# Patient Record
Sex: FEMALE | Race: WHITE | NOT HISPANIC OR LATINO | Employment: OTHER | URBAN - METROPOLITAN AREA
[De-identification: names, ages, dates, MRNs, and addresses within clinical notes are randomized per-mention and may not be internally consistent; named-entity substitution may affect disease eponyms.]

---

## 2020-05-22 ENCOUNTER — HOSPITAL ENCOUNTER (EMERGENCY)
Facility: CLINIC | Age: 85
Discharge: HOME | End: 2020-05-22
Attending: EMERGENCY MEDICINE | Admitting: EMERGENCY MEDICINE
Payer: MEDICARE

## 2020-05-22 ENCOUNTER — APPOINTMENT (OUTPATIENT)
Dept: GENERAL RADIOLOGY | Facility: CLINIC | Age: 85
End: 2020-05-22
Attending: EMERGENCY MEDICINE
Payer: MEDICARE

## 2020-05-22 ENCOUNTER — APPOINTMENT (OUTPATIENT)
Dept: MRI IMAGING | Facility: CLINIC | Age: 85
End: 2020-05-22
Attending: EMERGENCY MEDICINE
Payer: MEDICARE

## 2020-05-22 VITALS
OXYGEN SATURATION: 96 % | HEART RATE: 89 BPM | SYSTOLIC BLOOD PRESSURE: 179 MMHG | TEMPERATURE: 97.8 F | DIASTOLIC BLOOD PRESSURE: 110 MMHG | RESPIRATION RATE: 25 BRPM

## 2020-05-22 DIAGNOSIS — I10 BENIGN ESSENTIAL HYPERTENSION: ICD-10-CM

## 2020-05-22 DIAGNOSIS — R42 DIZZINESS: ICD-10-CM

## 2020-05-22 LAB
ALBUMIN SERPL-MCNC: 3.9 G/DL (ref 3.4–5)
ALP SERPL-CCNC: 113 U/L (ref 40–150)
ALT SERPL W P-5'-P-CCNC: 38 U/L (ref 0–50)
ANION GAP SERPL CALCULATED.3IONS-SCNC: 7 MMOL/L (ref 3–14)
AST SERPL W P-5'-P-CCNC: 22 U/L (ref 0–45)
BASOPHILS # BLD AUTO: 0.1 10E9/L (ref 0–0.2)
BASOPHILS NFR BLD AUTO: 0.5 %
BILIRUB SERPL-MCNC: 0.3 MG/DL (ref 0.2–1.3)
BUN SERPL-MCNC: 12 MG/DL (ref 7–30)
CALCIUM SERPL-MCNC: 9.2 MG/DL (ref 8.5–10.1)
CHLORIDE SERPL-SCNC: 105 MMOL/L (ref 94–109)
CO2 SERPL-SCNC: 26 MMOL/L (ref 20–32)
CREAT SERPL-MCNC: 0.62 MG/DL (ref 0.52–1.04)
D DIMER PPP FEU-MCNC: 0.6 UG/ML FEU (ref 0–0.5)
DIFFERENTIAL METHOD BLD: NORMAL
EOSINOPHIL # BLD AUTO: 0.1 10E9/L (ref 0–0.7)
EOSINOPHIL NFR BLD AUTO: 1.2 %
ERYTHROCYTE [DISTWIDTH] IN BLOOD BY AUTOMATED COUNT: 14.4 % (ref 10–15)
GFR SERPL CREATININE-BSD FRML MDRD: 82 ML/MIN/{1.73_M2}
GLUCOSE SERPL-MCNC: 111 MG/DL (ref 70–99)
HCT VFR BLD AUTO: 42.2 % (ref 35–47)
HGB BLD-MCNC: 13.3 G/DL (ref 11.7–15.7)
IMM GRANULOCYTES # BLD: 0 10E9/L (ref 0–0.4)
IMM GRANULOCYTES NFR BLD: 0.4 %
LYMPHOCYTES # BLD AUTO: 2 10E9/L (ref 0.8–5.3)
LYMPHOCYTES NFR BLD AUTO: 18.3 %
MCH RBC QN AUTO: 28.5 PG (ref 26.5–33)
MCHC RBC AUTO-ENTMCNC: 31.5 G/DL (ref 31.5–36.5)
MCV RBC AUTO: 90 FL (ref 78–100)
MONOCYTES # BLD AUTO: 0.8 10E9/L (ref 0–1.3)
MONOCYTES NFR BLD AUTO: 7.7 %
NEUTROPHILS # BLD AUTO: 7.8 10E9/L (ref 1.6–8.3)
NEUTROPHILS NFR BLD AUTO: 71.9 %
NRBC # BLD AUTO: 0 10*3/UL
NRBC BLD AUTO-RTO: 0 /100
NT-PROBNP SERPL-MCNC: 44 PG/ML (ref 0–1800)
PLATELET # BLD AUTO: 378 10E9/L (ref 150–450)
POTASSIUM SERPL-SCNC: 3.7 MMOL/L (ref 3.4–5.3)
PROT SERPL-MCNC: 8 G/DL (ref 6.8–8.8)
RBC # BLD AUTO: 4.67 10E12/L (ref 3.8–5.2)
SODIUM SERPL-SCNC: 138 MMOL/L (ref 133–144)
TROPONIN I SERPL-MCNC: <0.015 UG/L (ref 0–0.04)
TSH SERPL DL<=0.005 MIU/L-ACNC: 0.82 MU/L (ref 0.4–4)
WBC # BLD AUTO: 10.8 10E9/L (ref 4–11)

## 2020-05-22 PROCEDURE — 25500064 ZZH RX 255 OP 636: Performed by: EMERGENCY MEDICINE

## 2020-05-22 PROCEDURE — 80053 COMPREHEN METABOLIC PANEL: CPT | Performed by: EMERGENCY MEDICINE

## 2020-05-22 PROCEDURE — 85025 COMPLETE CBC W/AUTO DIFF WBC: CPT | Performed by: EMERGENCY MEDICINE

## 2020-05-22 PROCEDURE — 83880 ASSAY OF NATRIURETIC PEPTIDE: CPT | Performed by: EMERGENCY MEDICINE

## 2020-05-22 PROCEDURE — 70553 MRI BRAIN STEM W/O & W/DYE: CPT

## 2020-05-22 PROCEDURE — 84443 ASSAY THYROID STIM HORMONE: CPT | Performed by: EMERGENCY MEDICINE

## 2020-05-22 PROCEDURE — 96360 HYDRATION IV INFUSION INIT: CPT | Mod: 59

## 2020-05-22 PROCEDURE — 96361 HYDRATE IV INFUSION ADD-ON: CPT

## 2020-05-22 PROCEDURE — 99285 EMERGENCY DEPT VISIT HI MDM: CPT | Mod: 25

## 2020-05-22 PROCEDURE — 25800030 ZZH RX IP 258 OP 636: Performed by: EMERGENCY MEDICINE

## 2020-05-22 PROCEDURE — 84484 ASSAY OF TROPONIN QUANT: CPT | Performed by: EMERGENCY MEDICINE

## 2020-05-22 PROCEDURE — A9585 GADOBUTROL INJECTION: HCPCS | Performed by: EMERGENCY MEDICINE

## 2020-05-22 PROCEDURE — 85379 FIBRIN DEGRADATION QUANT: CPT | Performed by: EMERGENCY MEDICINE

## 2020-05-22 PROCEDURE — 93005 ELECTROCARDIOGRAM TRACING: CPT

## 2020-05-22 PROCEDURE — 71045 X-RAY EXAM CHEST 1 VIEW: CPT

## 2020-05-22 RX ORDER — GADOBUTROL 604.72 MG/ML
7.5 INJECTION INTRAVENOUS ONCE
Status: COMPLETED | OUTPATIENT
Start: 2020-05-22 | End: 2020-05-22

## 2020-05-22 RX ADMIN — GADOBUTROL 7.5 ML: 604.72 INJECTION INTRAVENOUS at 20:30

## 2020-05-22 RX ADMIN — SODIUM CHLORIDE 500 ML: 9 INJECTION, SOLUTION INTRAVENOUS at 20:07

## 2020-05-22 ASSESSMENT — ENCOUNTER SYMPTOMS
SHORTNESS OF BREATH: 1
DIZZINESS: 1
FEVER: 0
COUGH: 0

## 2020-05-22 NOTE — ED AVS SNAPSHOT
[facility] Emergency Department  [address]  [facility] [address]  Phone:  [phone]  Fax:  [phone]                                    Venita Renee   MRN: [id number]    Department:  [facility] Emergency Department   Date of Visit:  5/22/2020           After Visit Summary Signature Page    I have received my discharge instructions, and my questions have been answered. I have discussed any challenges I see with this plan with the nurse or doctor.    ..........................................................................................................................................  Patient/Patient Representative Signature      ..........................................................................................................................................  Patient Representative Print Name and Relationship to Patient    ..................................................               ................................................  Date                                   Time    ..........................................................................................................................................  Reviewed by Signature/Title    ...................................................              ..............................................  Date                                               Time          22EPIC Rev 08/18

## 2020-05-22 NOTE — ED TRIAGE NOTES
Feeling wobbly and dizzy since Friday. Started exercising recently and thought maybe symptoms were due to that. Went to PN then thinking maybe it was a UTI. Went to PN and UA and EKG were complete. UA was normal, but EKG was abnormal so referred to ED. Denies chest pain, but this week has had some increased ZARATE.

## 2020-05-22 NOTE — ED PROVIDER NOTES
History     Chief Complaint:  Shortness of Breath and Abnormal Ekg    HPI   Venita Renee is a 84 year old female who presents from clinic for evaluation of of intermittent dizziness. This afternoon, the patient presented to her [facility] clinic with concern for a UTI in the setting of recent dizzy spells. She reports her UA was negative, however the provider referred her to the ED with concerns for an abnormal EKG. Here, she reports the dizziness is primarily associated with sitting up too fast. She denies any headache, numbness/tingling, or any other neurologic symptoms. She also reports some mild shortness of breath with movement over the last several weeks, however this has not been acutely worse in the last week and has been ongoing for awhile. She also denies any chest pain, cough, fever, or noted leg swelling. Of note, the patient is originally from [place] and reports moving to [place] only one year ago. No hx of DVT/PE. Unclear what was abnormal about her EKG in clinic today.    Allergies:  No Known Allergies     Medications:    Prilosec    Past Medical History:    GERD  Hypertension  Macular degeneration     Past Surgical History:    The patient does not have any pertinent past surgical history.    Family History:    No past pertinent family history.    Social History:  Negative for tobacco use.  Negative for alcohol use.  Negative for drug use.        Review of Systems   Constitutional: Negative for fever.   Respiratory: Positive for shortness of breath. Negative for cough.    Cardiovascular: Negative for chest pain and leg swelling.   Neurological: Positive for dizziness.   All other systems reviewed and are negative.    Physical Exam     Patient Vitals for the past 24 hrs:   BP Temp Temp src Pulse Heart Rate Resp SpO2   05/22/20 2000 (!) 179/119 -- -- 98 -- -- 95 %   05/22/20 1845 (!) 140/98 -- -- 104 105 25 95 %   05/22/20 1830 (!) 178/108 -- -- 108 113 30 94 %   05/22/20 1800 (!) 173/112 -- --  110 108 26 96 %   05/22/20 1745 (!) 160/110 -- -- 112 110 26 97 %   05/22/20 1700 (!) 165/107 -- -- 107 110 17 96 %   05/22/20 1645 (!) 157/107 -- -- 111 109 16 97 %   05/22/20 1630 (!) 155/106 -- -- 111 112 22 --   05/22/20 1605 -- -- -- 121 -- -- 96 %   05/22/20 1601 (!) 181/125 97.8  F (36.6  C) Oral -- 118 -- (!) 86 %      Physical Exam  General: Resting comfortably on the gurney  Eyes:  The pupils are equal and round    Conjunctivae and sclerae are normal  ENT:    Moist mucous membranes  Neck:  Normal range of motion  CV:  Tachycardic rate and regular rhythm    Skin warm and well perfused   Resp:  Lungs are clear    Non-labored    No rales    No wheezing   GI:  Abdomen is soft, there is no rigidity    No distension    No rebound tenderness     No abdominal tenderness  MS:  Normal muscular tone  Skin:  No rash or acute skin lesions noted  Neuro:   Awake, alert.      Gait steady      Finger to nose intact    Speech is normal and fluent.    Face is symmetric.     Moves all extremities equally    No arm or leg drift    SILT on bilateral UE/LE  Psych: Normal affect.  Appropriate interactions.    Emergency Department Course   ECG:  Indication: Shortness of Breath  Time: 1619  Vent. Rate 115 bpm. IN interval 164. QRS duration 77. QT/QTc 330/456. P-R-T axis 49 -27 76.    Sinus tachycardia.  Possible anterior infarct, age undetermined.   Abnormal ECG.   No old EKG for comparison. Read time: 1628.    Imaging:  Radiographic findings were communicated with the patient who voiced understanding of the findings.  XR Chest Port 1 view:   Negative chest, as per radiology.     MR Brain w/o & w/ contrast:  Negative for age, no bleed, mass, or areas of acute infarction, as per radiology.     Laboratory:  CBC: WBC: 10.8, HGB: 13.3, PLT: 378  CMP: Glucose 111 (H), o/w WNL (Creatinine: 0.62)    1649 Troponin: <0.015   D dimer: 0.6 (H)  BNP: 44  TSH: 0.82    Interventions:  2007  mL IV     Emergency Department Course:  Nursing  notes and vitals reviewed.   EKG obtained in the ED, see results above.     1640: I performed an exam of the patient as documented above.      IV was inserted and blood was drawn for laboratory testing, results above. Medicine administered as documented above.     Portable chest x-ray was obtained while she was in the emergency department, results above.      1915: I rechecked the patient and discussed the results of her workup thus far. I ambulated the patient and she walked with a steady gait. Plan will be discharge if her MRI is unremarkable.     The patient was sent for a MR brain while in the emergency department, results above.      Findings and plan explained to the Patient. Patient discharged home with instructions regarding supportive care, medications, and reasons to return. The importance of close follow-up was reviewed.    I personally reviewed the laboratory and imaging results with the Patient and answered all related questions prior to discharge.    Impression & Plan      Medical Decision Making:  Venita Renee is a 84 year old female who presents with dizziness. Patient with dizziness off and on last few days. Initial oxygen saturation incorrectly entered on arrival to ED as initially documented as 86% on RA but few minutes later was 96% on RA with no intervention. Was never hypoxic while in ED room even with ambulation and after ambulation. Non focal neurologic exam and did ambulate in ED and did well doing this. UA done at clinic that did not show UTI. EKG done in clinic that I am not able to see that was reportedly abnormal. EKG today shows sinus tachycardia with no acute ischemic changes. Labs today unremarkable including negative troponin. D-dimer within normal limits using age adjusted d-dimer and don't think additional workup for this is indicated in ED. Chest xray clear. MRI brain shows no signs of CVA. Patient did well when I ambulated her in ED. Reports that she does feel that she can  manage at home and feels safe at home. Declines walker. Unclear etiology of dizziness. Patient does not appear short of breath and she is not hypoxic. Actually felt pretty normal when walking in ED with no shortness of breath, chest pain or dizziness. Recommended follow-up with PCP. May need echocardiogram. Symptoms don't clearly sound like angina and don't think hospitalization is indicated. Recommended to move slowly when changing positions. Blood pressure mildly elevated in ED and recommended recheck with PCP.    Diagnosis:    ICD-10-CM    1. Dizziness  R42    2. Benign essential hypertension  I10      Disposition:  discharged to home    Discharge Medications:  New Prescriptions    No medications on file       Scribe Disclosure:  I, Linda Anamika, am serving as a scribe on 5/22/2020 at 4:52 PM to personally document services performed by Kaelyn Justice MD based on my observations and the provider's statements to me.     5/22/2020   [facility] EMERGENCY DEPARTMENT       Kaelyn Justice MD  05/22/20 [id number]       Kaelyn Justice MD  05/23/20 [id number]

## 2020-05-23 NOTE — DISCHARGE INSTRUCTIONS
Follow up with primary care provider   No signs of stroke on MRI  Make sure to change positions slowly when moving around  Keep hydrated at home  Recheck blood pressure with primary care provider

## 2020-05-24 LAB — INTERPRETATION ECG - MUSE: NORMAL

## 2020-11-08 ENCOUNTER — APPOINTMENT (OUTPATIENT)
Dept: CT IMAGING | Facility: CLINIC | Age: 85
End: 2020-11-08
Attending: EMERGENCY MEDICINE
Payer: MEDICARE

## 2020-11-08 ENCOUNTER — HOSPITAL ENCOUNTER (EMERGENCY)
Facility: CLINIC | Age: 85
Discharge: HOME | End: 2020-11-08
Attending: EMERGENCY MEDICINE | Admitting: EMERGENCY MEDICINE
Payer: MEDICARE

## 2020-11-08 VITALS
TEMPERATURE: 97.9 F | HEART RATE: 90 BPM | SYSTOLIC BLOOD PRESSURE: 144 MMHG | OXYGEN SATURATION: 96 % | RESPIRATION RATE: 12 BRPM | DIASTOLIC BLOOD PRESSURE: 93 MMHG

## 2020-11-08 DIAGNOSIS — E04.1 THYROID NODULE: ICD-10-CM

## 2020-11-08 DIAGNOSIS — G93.89 CEREBRAL VENTRICULOMEGALY: ICD-10-CM

## 2020-11-08 DIAGNOSIS — R42 DIZZINESS: ICD-10-CM

## 2020-11-08 LAB
ALBUMIN UR-MCNC: NEGATIVE MG/DL
ANION GAP SERPL CALCULATED.3IONS-SCNC: 5 MMOL/L (ref 3–14)
APPEARANCE UR: CLEAR
BASOPHILS # BLD AUTO: 0.1 10E9/L (ref 0–0.2)
BASOPHILS NFR BLD AUTO: 0.4 %
BILIRUB UR QL STRIP: NEGATIVE
BUN SERPL-MCNC: 11 MG/DL (ref 7–30)
CALCIUM SERPL-MCNC: 9.1 MG/DL (ref 8.5–10.1)
CHLORIDE SERPL-SCNC: 107 MMOL/L (ref 94–109)
CO2 SERPL-SCNC: 27 MMOL/L (ref 20–32)
COLOR UR AUTO: ABNORMAL
CREAT SERPL-MCNC: 0.64 MG/DL (ref 0.52–1.04)
DIFFERENTIAL METHOD BLD: ABNORMAL
EOSINOPHIL # BLD AUTO: 0 10E9/L (ref 0–0.7)
EOSINOPHIL NFR BLD AUTO: 0.3 %
ERYTHROCYTE [DISTWIDTH] IN BLOOD BY AUTOMATED COUNT: 14.9 % (ref 10–15)
GFR SERPL CREATININE-BSD FRML MDRD: 81 ML/MIN/{1.73_M2}
GLUCOSE SERPL-MCNC: 107 MG/DL (ref 70–99)
GLUCOSE UR STRIP-MCNC: NEGATIVE MG/DL
HCT VFR BLD AUTO: 40.7 % (ref 35–47)
HGB BLD-MCNC: 13.4 G/DL (ref 11.7–15.7)
HGB UR QL STRIP: ABNORMAL
IMM GRANULOCYTES # BLD: 0 10E9/L (ref 0–0.4)
IMM GRANULOCYTES NFR BLD: 0.3 %
INTERPRETATION ECG - MUSE: NORMAL
KETONES UR STRIP-MCNC: NEGATIVE MG/DL
LEUKOCYTE ESTERASE UR QL STRIP: NEGATIVE
LYMPHOCYTES # BLD AUTO: 1.7 10E9/L (ref 0.8–5.3)
LYMPHOCYTES NFR BLD AUTO: 13.1 %
MCH RBC QN AUTO: 29.3 PG (ref 26.5–33)
MCHC RBC AUTO-ENTMCNC: 32.9 G/DL (ref 31.5–36.5)
MCV RBC AUTO: 89 FL (ref 78–100)
MONOCYTES # BLD AUTO: 0.9 10E9/L (ref 0–1.3)
MONOCYTES NFR BLD AUTO: 7.1 %
MUCOUS THREADS #/AREA URNS LPF: PRESENT /LPF
NEUTROPHILS # BLD AUTO: 10.2 10E9/L (ref 1.6–8.3)
NEUTROPHILS NFR BLD AUTO: 78.8 %
NITRATE UR QL: NEGATIVE
NRBC # BLD AUTO: 0 10*3/UL
NRBC BLD AUTO-RTO: 0 /100
PH UR STRIP: 6.5 PH (ref 5–7)
PLATELET # BLD AUTO: 393 10E9/L (ref 150–450)
POTASSIUM SERPL-SCNC: 3.6 MMOL/L (ref 3.4–5.3)
RBC # BLD AUTO: 4.57 10E12/L (ref 3.8–5.2)
RBC #/AREA URNS AUTO: 1 /HPF (ref 0–2)
SODIUM SERPL-SCNC: 139 MMOL/L (ref 133–144)
SOURCE: ABNORMAL
SP GR UR STRIP: 1 (ref 1–1.03)
SQUAMOUS #/AREA URNS AUTO: 3 /HPF (ref 0–1)
TROPONIN I SERPL-MCNC: <0.015 UG/L (ref 0–0.04)
UROBILINOGEN UR STRIP-MCNC: NORMAL MG/DL (ref 0–2)
WBC # BLD AUTO: 13 10E9/L (ref 4–11)
WBC #/AREA URNS AUTO: 1 /HPF (ref 0–5)

## 2020-11-08 PROCEDURE — 80048 BASIC METABOLIC PNL TOTAL CA: CPT | Performed by: EMERGENCY MEDICINE

## 2020-11-08 PROCEDURE — 250N000011 HC RX IP 250 OP 636: Performed by: EMERGENCY MEDICINE

## 2020-11-08 PROCEDURE — 87086 URINE CULTURE/COLONY COUNT: CPT | Performed by: EMERGENCY MEDICINE

## 2020-11-08 PROCEDURE — 258N000003 HC RX IP 258 OP 636: Performed by: EMERGENCY MEDICINE

## 2020-11-08 PROCEDURE — 85025 COMPLETE CBC W/AUTO DIFF WBC: CPT | Performed by: EMERGENCY MEDICINE

## 2020-11-08 PROCEDURE — 70450 CT HEAD/BRAIN W/O DYE: CPT | Mod: XS

## 2020-11-08 PROCEDURE — 93005 ELECTROCARDIOGRAM TRACING: CPT

## 2020-11-08 PROCEDURE — 81001 URINALYSIS AUTO W/SCOPE: CPT | Performed by: EMERGENCY MEDICINE

## 2020-11-08 PROCEDURE — 84484 ASSAY OF TROPONIN QUANT: CPT | Performed by: EMERGENCY MEDICINE

## 2020-11-08 PROCEDURE — 70498 CT ANGIOGRAPHY NECK: CPT

## 2020-11-08 PROCEDURE — 250N000009 HC RX 250: Performed by: EMERGENCY MEDICINE

## 2020-11-08 PROCEDURE — 99285 EMERGENCY DEPT VISIT HI MDM: CPT | Mod: 25

## 2020-11-08 PROCEDURE — 96360 HYDRATION IV INFUSION INIT: CPT | Mod: 59

## 2020-11-08 PROCEDURE — 96361 HYDRATE IV INFUSION ADD-ON: CPT

## 2020-11-08 RX ORDER — SODIUM CHLORIDE 9 MG/ML
INJECTION, SOLUTION INTRAVENOUS CONTINUOUS
Status: DISCONTINUED | OUTPATIENT
Start: 2020-11-08 | End: 2020-11-08 | Stop reason: HOSPADM

## 2020-11-08 RX ORDER — IOPAMIDOL 755 MG/ML
70 INJECTION, SOLUTION INTRAVASCULAR ONCE
Status: COMPLETED | OUTPATIENT
Start: 2020-11-08 | End: 2020-11-08

## 2020-11-08 RX ORDER — MECLIZINE HYDROCHLORIDE 25 MG/1
25 TABLET ORAL ONCE
Status: DISCONTINUED | OUTPATIENT
Start: 2020-11-08 | End: 2020-11-08

## 2020-11-08 RX ADMIN — SODIUM CHLORIDE 90 ML: 9 INJECTION, SOLUTION INTRAVENOUS at 17:01

## 2020-11-08 RX ADMIN — SODIUM CHLORIDE 1000 ML: 9 INJECTION, SOLUTION INTRAVENOUS at 16:07

## 2020-11-08 RX ADMIN — IOPAMIDOL 140 ML: 755 INJECTION, SOLUTION INTRAVENOUS at 17:00

## 2020-11-08 ASSESSMENT — ENCOUNTER SYMPTOMS
COUGH: 0
SHORTNESS OF BREATH: 0
FEVER: 0

## 2020-11-08 NOTE — ED NOTES
Bed: ED28  Expected date:   Expected time:   Means of arrival:   Comments:  HE 85F dizziness/ vomitting  HTN

## 2020-11-08 NOTE — ED AVS SNAPSHOT
[facility] Emergency Dept  [address] [address]  Phone: [phone]  Fax: [phone]                                    Venita Renee   MRN: [id number]    Department: [facility] Emergency Dept   Date of Visit: 11/8/2020           After Visit Summary Signature Page    I have received my discharge instructions, and my questions have been answered. I have discussed any challenges I see with this plan with the nurse or doctor.    ..........................................................................................................................................  Patient/Patient Representative Signature      ..........................................................................................................................................  Patient Representative Print Name and Relationship to Patient    ..................................................               ................................................  Date                                   Time    ..........................................................................................................................................  Reviewed by Signature/Title    ...................................................              ..............................................  Date                                               Time          22EPIC Rev 08/18

## 2020-11-08 NOTE — ED TRIAGE NOTES
"Pt reports 1 episode of emesis and nausea. Dizziness since this afternoon with unsteady gait. Reports brief moment of unable to \"think correctly/fogginess\". Hx of UTIs. VSS for EMS.  .  "

## 2020-11-08 NOTE — ED PROVIDER NOTES
History   Chief Complaint  Transient Ataxia    HPI   Venita Renee is a 85 year old female with a history of hypertension, hyperlipidemia, macular degeneration, and recurrent UTI who was BIBA for evaluation of transient unsteadiness/dizziness today that lasted around 30 minutes while she was at home. Venita states she took a shower today and exercised in the pool before relaxing at home. She reports standing up to use the restroom when she had sudden onset of issues with her balance and issues with mentation. Patient then took a nap and woke up with resolution of symptoms. She denies any symptoms now. She denies dizziness, cough, fever, shortness of breath, chest pain, and sore throat. Of note, she explains she has a history of recurrent UTI with associated dizziness many times in the past. Venita notes that her symptoms today were very similar to her past UTIs.  Patient does state that she has had negative UAs that had positive culture results in the past.  She states that Denies history of diabetes or hyperlipidemia.    MR Brain w/o and w contrast  IMPRESSION:  1.  Negative for age, no bleed, mass, or areas of acute infarction    Allergies  Amlodipine  Cephalexin  Penicillins    Medications  Prilosec  Cozaar  Premarin    Past Medical History  Hypertension  Hyperlipidemia  Macular degenration    Past Surgical History  Total abdominal hysterectomy and salpingo-oophorectomy  bunionectomy    Family History  No past pertinent family history.    Social History  Tobacco use: former smoker  Alcohol use: yes  Drug use: no  Marital Status:     Review of Systems   Constitutional: Negative for fever.   Respiratory: Negative for cough and shortness of breath.    Cardiovascular: Negative for chest pain.   All other systems reviewed and are negative.    Physical Exam     Patient Vitals for the past 24 hrs:   BP Temp Temp src Pulse Resp SpO2   11/08/20 1745 -- -- -- 98 28 96 %   11/08/20 1645 -- -- -- -- -- 96 %    11/08/20 1630 (!) 145/104 -- -- -- -- --   11/08/20 1553 (!) 139/100 97.9  F (36.6  C) Oral 104 16 97 %       Physical Exam  VS: Reviewed per above  HENT: Mucous membranes moist, no nuchal rigidity  EYES: sclera anicteric  CV: Rate as noted, regular rhythm.   RESP: Effort normal. Breath sounds are normal bilaterally.  GI: no tenderness/rebound/guarding, not distended.  NEURO: GCS 15, cranial nerves II through XII are intact, 5 out of 5 strength in all 4 extremities, sensation is intact light touch in all 4 extremities. Normal finger-nose-finger testing bilaterally, no ataxia with ambulation to bathroom  MSK: No deformity of the extremities  SKIN: Warm and dry    Emergency Department Course   EKG  Indication: AMS  Time: 1617  Rate 100 bpm. IN interval 160. QRS duration 72. QT/QTc 372/479.   Normal sinus rhythm  Right atrial enlargement  Nonspecific ST and T wave abnormality  Abnormal ECG   No significant changes as compared to prior, dated 22/05/2020.  Read time: 1620  Read by Hai Chang MD    Imaging:  Radiology findings were communicated with the patient who voiced understanding of the findings.    CTA Head Neck with Contrast  IMPRESSION:  1. Mild atherosclerosis of the bilateral carotid bifurcations and mild  intracranial atherosclerosis. No flow limiting stenosis or large  vessel occlusion of the major craniocervical arterial vasculature.  2. Multiple thyroid nodules measuring up to 1.8 cm in the left lobe.  Inferior extension of the right more than left thyroid lobes into the  upper mediastinum. The appearance is consistent with a multinodular  goiter. If it would change the patient's clinical management, consider  follow-up thyroid ultrasound.    Head CT w/o contrast  IMPRESSION:  1. No definite CT findings of acute intracranial abnormality.  2. Brain atrophy and presumed chronic small vessel ischemic disease,  as described.  3. Redemonstrated mild to moderate ventriculomegaly,  somewhat  disproportionate to the degree of generalized brain parenchymal volume  loss. This may be related to central white matter volume loss and  extra-axial phenomenon; however, a component of chronic  communicating/normal pressure hydrocephalus cannot be entirely  excluded. Recommend clinical correlation.    Readings per Radiology    Laboratory:  Laboratory findings were communicated with the patient who voiced understanding of the findings.    CBC: WBC: 13.0 (high), HGB: 13.4, PLT: 393  BMP: Glucose 107 (high), o/w WNL (Creatinine: 0.64)  Troponin I (Collected at 1604): <0.015    UA with Microscopic: Blood: Trace (A), Squamous Epithelial/HPF: 3 (high), Mucous: Present (A), o/w WNL    Interventions:  1607 NS 1L IV    Emergency Department Course:  Past medical records, nursing notes, and vitals reviewed.    1552 I physically examined the patient as documented above.    EKG obtained in the ED, see results above.   IV was inserted and blood was drawn for laboratory testing, results above.  The patient provided a urine sample here in the emergency department. This was sent for laboratory testing, findings above.  The patient was sent for radiographs while in the emergency department, results above.     Findings and plan explained to the Patient. Patient discharged home with instructions regarding supportive care, medications, and reasons to return. The importance of close follow-up was reviewed.     I personally reviewed the laboratory and imaging results with the Patient and answered all related questions prior to discharge.     Impression & Plan   Medical Decision Making:  Patient presents to the ER for evaluation after episode of dizziness versus unsteadiness earlier today.  On arrival vital signs are reassuring.  On exam there are no appreciable neurological deficits.  By history, she no longer has ongoing symptoms.  She reports similar symptoms in the past with UTIs.  I see that she was evaluated in the ER in  May of this year for similar symptoms and had a negative MRI of the brain.  As her symptoms are resolved, occult stroke seems unlikely.  I did obtain vascular imaging as well as CT without contrast of the head due to possible TIA like event.  Fortunately there was no evidence of large vessel occlusion or dissection or intracranial hemorrhage.  There were incidental thyroid nodules, which was relayed to patient.  She states she knows about these and is following with routine ultrasound imaging.  Other basic labs are reassuring.  There is no evidence of UTI today.  Her urine was sent for culture however.  I did refer her to neurology as there was some mild ventriculomegaly as well as possible TIA like event.  I recommended she start a baby aspirin daily.  It does seem that she has had very similar symptoms in the past of unclear etiology, but possibly associated with UTIs.  Return precautions were discussed prior to discharge.    Diagnosis:    ICD-10-CM    1. Dizziness  R42    2. Cerebral ventriculomegaly  G93.89    3. Thyroid nodule  E04.1        Disposition:  Discharged to home.    Scribe Disclosure:  I, Gamaliel Mccallum, am serving as a scribe at 3:52 PM on 11/8/2020 to document services personally performed by Hai Chang MD based on my observations and the provider's statements to me.      Hai Chang MD  11/08/20 [id number]

## 2020-11-09 LAB
BACTERIA SPEC CULT: NORMAL
Lab: NORMAL
SPECIMEN SOURCE: NORMAL

## 2020-11-09 NOTE — DISCHARGE INSTRUCTIONS
Take a baby aspirin every day until you meet with neurologist.    Discharge Instructions  Dizziness (Lightheaded)  Today you were seen for dizziness.  Dizziness can be caused by many things and it can be very difficult to determine the cause of dizziness.  At this time, your provider has found no signs that your dizziness is due to a serious or life-threatening condition. However, sometimes there is a serious problem that does not show up right away, and it is important for you to follow up with your regular provider as instructed.  Generally, every Emergency Department visit should have a follow-up clinic visit with either a primary or a specialty clinic/provider. Please follow-up as instructed by your emergency provider today.      Return to the Emergency Department if:    You pass out (fainting or falling out), especially during exercise.    You develop chest pain, chest pressure or difficulty breathing.  Your feel an irregular heartbeat.  You have excessive vaginal bleeding, or blood in your stool or vomit (throw up).  You have a high fever.  Your symptoms get worse or more frequent.    If when you begin to feel dizzy or lightheaded, it is important to sit down or lay down immediately to prevent injury from falling.  If you were given a prescription for medicine here today, be sure to read all of the information (including the package insert) that comes with your prescription.  This will include important information about the medicine, its side effects, and any warnings that you need to know about.  The pharmacist who fills the prescription can provide more information and answer questions you may have about the medicine.  If you have questions or concerns that the pharmacist cannot address, please call or return to the Emergency Department.   Remember that you can always come back to the Emergency Department if you are not able to see your regular provider in the amount of time listed above, if you get any new  symptoms, or if there is anything that worries you.

## 2020-11-10 NOTE — RESULT ENCOUNTER NOTE
Final urine culture report is NEGATIVE per [facility] ED Lab Result protocol.    If NEGATIVE result, no change in treatment, per [facility] ED Lab Result protocol.

## 2023-08-20 ENCOUNTER — HOSPITAL ENCOUNTER (INPATIENT)
Facility: HOSPITAL | Age: 88
LOS: 2 days | Discharge: HOME HEALTH | DRG: 603 | End: 2023-08-22
Attending: STUDENT IN AN ORGANIZED HEALTH CARE EDUCATION/TRAINING PROGRAM | Admitting: STUDENT IN AN ORGANIZED HEALTH CARE EDUCATION/TRAINING PROGRAM
Payer: MEDICARE

## 2023-08-20 ENCOUNTER — APPOINTMENT (OUTPATIENT)
Dept: MRI IMAGING | Facility: HOSPITAL | Age: 88
DRG: 603 | End: 2023-08-20
Attending: STUDENT IN AN ORGANIZED HEALTH CARE EDUCATION/TRAINING PROGRAM
Payer: MEDICARE

## 2023-08-20 DIAGNOSIS — S81.001A OPEN WOUND OF KNEE, LEG, AND ANKLE, RIGHT, INITIAL ENCOUNTER: Primary | ICD-10-CM

## 2023-08-20 DIAGNOSIS — S81.801A OPEN WOUND OF KNEE, LEG, AND ANKLE, RIGHT, INITIAL ENCOUNTER: Primary | ICD-10-CM

## 2023-08-20 DIAGNOSIS — S91.001A OPEN WOUND OF KNEE, LEG, AND ANKLE, RIGHT, INITIAL ENCOUNTER: Primary | ICD-10-CM

## 2023-08-20 PROBLEM — L03.90 ACUTE CELLULITIS: Status: ACTIVE | Noted: 2023-08-20

## 2023-08-20 LAB
ANION GAP SERPL CALCULATED.3IONS-SCNC: 13 MMOL/L (ref 7–15)
BUN SERPL-MCNC: 10.7 MG/DL (ref 8–23)
CALCIUM SERPL-MCNC: 9.1 MG/DL (ref 8.8–10.2)
CHLORIDE SERPL-SCNC: 102 MMOL/L (ref 98–107)
CREAT SERPL-MCNC: 0.61 MG/DL (ref 0.51–0.95)
DEPRECATED HCO3 PLAS-SCNC: 22 MMOL/L (ref 22–29)
ERYTHROCYTE [DISTWIDTH] IN BLOOD BY AUTOMATED COUNT: 14.8 % (ref 10–15)
GFR SERPL CREATININE-BSD FRML MDRD: 86 ML/MIN/1.73M2
GLUCOSE SERPL-MCNC: 119 MG/DL (ref 70–99)
HCT VFR BLD AUTO: 33.9 % (ref 35–47)
HGB BLD-MCNC: 11.2 G/DL (ref 11.7–15.7)
MCH RBC QN AUTO: 28.7 PG (ref 26.5–33)
MCHC RBC AUTO-ENTMCNC: 33 G/DL (ref 31.5–36.5)
MCV RBC AUTO: 87 FL (ref 78–100)
PLATELET # BLD AUTO: 295 10E3/UL (ref 150–450)
POTASSIUM SERPL-SCNC: 3.6 MMOL/L (ref 3.4–5.3)
RBC # BLD AUTO: 3.9 10E6/UL (ref 3.8–5.2)
SODIUM SERPL-SCNC: 137 MMOL/L (ref 136–145)
WBC # BLD AUTO: 17.3 10E3/UL (ref 4–11)

## 2023-08-20 PROCEDURE — 120N000001 HC R&B MED SURG/OB

## 2023-08-20 PROCEDURE — 93005 ELECTROCARDIOGRAM TRACING: CPT

## 2023-08-20 PROCEDURE — 93010 ELECTROCARDIOGRAM REPORT: CPT | Mod: HIP | Performed by: INTERNAL MEDICINE

## 2023-08-20 PROCEDURE — 258N000003 HC RX IP 258 OP 636: Performed by: STUDENT IN AN ORGANIZED HEALTH CARE EDUCATION/TRAINING PROGRAM

## 2023-08-20 PROCEDURE — 93005 ELECTROCARDIOGRAM TRACING: CPT | Performed by: STUDENT IN AN ORGANIZED HEALTH CARE EDUCATION/TRAINING PROGRAM

## 2023-08-20 PROCEDURE — 99223 1ST HOSP IP/OBS HIGH 75: CPT | Mod: AI | Performed by: STUDENT IN AN ORGANIZED HEALTH CARE EDUCATION/TRAINING PROGRAM

## 2023-08-20 PROCEDURE — 80048 BASIC METABOLIC PNL TOTAL CA: CPT | Performed by: STUDENT IN AN ORGANIZED HEALTH CARE EDUCATION/TRAINING PROGRAM

## 2023-08-20 PROCEDURE — 36415 COLL VENOUS BLD VENIPUNCTURE: CPT | Performed by: STUDENT IN AN ORGANIZED HEALTH CARE EDUCATION/TRAINING PROGRAM

## 2023-08-20 PROCEDURE — A9585 GADOBUTROL INJECTION: HCPCS | Mod: JZ | Performed by: STUDENT IN AN ORGANIZED HEALTH CARE EDUCATION/TRAINING PROGRAM

## 2023-08-20 PROCEDURE — 85027 COMPLETE CBC AUTOMATED: CPT | Performed by: STUDENT IN AN ORGANIZED HEALTH CARE EDUCATION/TRAINING PROGRAM

## 2023-08-20 PROCEDURE — G1010 CDSM STANSON: HCPCS

## 2023-08-20 PROCEDURE — 255N000002 HC RX 255 OP 636: Mod: JZ | Performed by: STUDENT IN AN ORGANIZED HEALTH CARE EDUCATION/TRAINING PROGRAM

## 2023-08-20 PROCEDURE — 87040 BLOOD CULTURE FOR BACTERIA: CPT | Performed by: STUDENT IN AN ORGANIZED HEALTH CARE EDUCATION/TRAINING PROGRAM

## 2023-08-20 RX ORDER — LOSARTAN POTASSIUM 50 MG/1
100 TABLET ORAL DAILY
Status: DISCONTINUED | OUTPATIENT
Start: 2023-08-21 | End: 2023-08-22 | Stop reason: HOSPADM

## 2023-08-20 RX ORDER — VITAMIN E (DL,TOCOPHERYL ACET) 90 MG
200 CAPSULE ORAL DAILY
COMMUNITY

## 2023-08-20 RX ORDER — LIDOCAINE 40 MG/G
CREAM TOPICAL
Status: DISCONTINUED | OUTPATIENT
Start: 2023-08-20 | End: 2023-08-22 | Stop reason: HOSPADM

## 2023-08-20 RX ORDER — GADOBUTROL 604.72 MG/ML
5 INJECTION INTRAVENOUS ONCE
Status: COMPLETED | OUTPATIENT
Start: 2023-08-20 | End: 2023-08-20

## 2023-08-20 RX ORDER — HEPARIN SODIUM 5000 [USP'U]/.5ML
5000 INJECTION, SOLUTION INTRAVENOUS; SUBCUTANEOUS EVERY 12 HOURS
Status: DISCONTINUED | OUTPATIENT
Start: 2023-08-21 | End: 2023-08-22 | Stop reason: HOSPADM

## 2023-08-20 RX ORDER — AMOXICILLIN 250 MG
2 CAPSULE ORAL 2 TIMES DAILY
Status: DISCONTINUED | OUTPATIENT
Start: 2023-08-21 | End: 2023-08-22 | Stop reason: HOSPADM

## 2023-08-20 RX ORDER — ONDANSETRON 4 MG/1
4 TABLET, ORALLY DISINTEGRATING ORAL EVERY 6 HOURS PRN
Status: DISCONTINUED | OUTPATIENT
Start: 2023-08-20 | End: 2023-08-22 | Stop reason: HOSPADM

## 2023-08-20 RX ORDER — LOSARTAN POTASSIUM 100 MG/1
100 TABLET ORAL DAILY
COMMUNITY
Start: 2022-12-14

## 2023-08-20 RX ORDER — SODIUM CHLORIDE 9 MG/ML
INJECTION, SOLUTION INTRAVENOUS CONTINUOUS
Status: DISCONTINUED | OUTPATIENT
Start: 2023-08-20 | End: 2023-08-21

## 2023-08-20 RX ORDER — VIT A/VIT C/VIT E/ZINC/COPPER 4296-226
1 CAPSULE ORAL 2 TIMES DAILY
COMMUNITY

## 2023-08-20 RX ORDER — AMOXICILLIN 250 MG
1 CAPSULE ORAL 2 TIMES DAILY
Status: DISCONTINUED | OUTPATIENT
Start: 2023-08-21 | End: 2023-08-22 | Stop reason: HOSPADM

## 2023-08-20 RX ORDER — LEVOFLOXACIN 5 MG/ML
500 INJECTION, SOLUTION INTRAVENOUS EVERY 24 HOURS
Status: DISCONTINUED | OUTPATIENT
Start: 2023-08-21 | End: 2023-08-22 | Stop reason: HOSPADM

## 2023-08-20 RX ORDER — AMLODIPINE BESYLATE 2.5 MG/1
2.5 TABLET ORAL DAILY
Status: DISCONTINUED | OUTPATIENT
Start: 2023-08-21 | End: 2023-08-22 | Stop reason: HOSPADM

## 2023-08-20 RX ORDER — MULTIPLE VITAMINS W/ MINERALS TAB 9MG-400MCG
1 TAB ORAL DAILY
COMMUNITY

## 2023-08-20 RX ORDER — ONDANSETRON 2 MG/ML
4 INJECTION INTRAMUSCULAR; INTRAVENOUS EVERY 6 HOURS PRN
Status: DISCONTINUED | OUTPATIENT
Start: 2023-08-20 | End: 2023-08-22 | Stop reason: HOSPADM

## 2023-08-20 RX ORDER — AMLODIPINE BESYLATE 2.5 MG/1
2.5 TABLET ORAL DAILY
COMMUNITY

## 2023-08-20 RX ADMIN — SODIUM CHLORIDE, PRESERVATIVE FREE: 5 INJECTION INTRAVENOUS at 20:01

## 2023-08-20 RX ADMIN — GADOBUTROL 5 ML: 604.72 INJECTION INTRAVENOUS at 22:42

## 2023-08-20 ASSESSMENT — ACTIVITIES OF DAILY LIVING (ADL)
DIFFICULTY_COMMUNICATING: NO
FALL_HISTORY_WITHIN_LAST_SIX_MONTHS: YES
NUMBER_OF_TIMES_PATIENT_HAS_FALLEN_WITHIN_LAST_SIX_MONTHS: 1
CHANGE_IN_FUNCTIONAL_STATUS_SINCE_ONSET_OF_CURRENT_ILLNESS/INJURY: YES
DIFFICULTY_EATING/SWALLOWING: NO
DRESSING/BATHING_DIFFICULTY: NO
WALKING_OR_CLIMBING_STAIRS_DIFFICULTY: NO
TOILETING_ISSUES: NO
DOING_ERRANDS_INDEPENDENTLY_DIFFICULTY: YES
WEAR_GLASSES_OR_BLIND: YES
ADLS_ACUITY_SCORE: 22
HEARING_DIFFICULTY_OR_DEAF: NO
EQUIPMENT_CURRENTLY_USED_AT_HOME: WALKER, ROLLING
ADLS_ACUITY_SCORE: 27
CONCENTRATING,_REMEMBERING_OR_MAKING_DECISIONS_DIFFICULTY: NO
VISION_MANAGEMENT: MACULAR DEGENERATION

## 2023-08-21 ENCOUNTER — APPOINTMENT (OUTPATIENT)
Dept: OCCUPATIONAL THERAPY | Facility: HOSPITAL | Age: 88
DRG: 603 | End: 2023-08-21
Attending: STUDENT IN AN ORGANIZED HEALTH CARE EDUCATION/TRAINING PROGRAM
Payer: MEDICARE

## 2023-08-21 ENCOUNTER — APPOINTMENT (OUTPATIENT)
Dept: PHYSICAL THERAPY | Facility: HOSPITAL | Age: 88
DRG: 603 | End: 2023-08-21
Attending: STUDENT IN AN ORGANIZED HEALTH CARE EDUCATION/TRAINING PROGRAM
Payer: MEDICARE

## 2023-08-21 PROBLEM — I10 PRIMARY HYPERTENSION: Status: ACTIVE | Noted: 2023-08-21

## 2023-08-21 PROBLEM — H35.30 MACULAR DEGENERATION (SENILE) OF RETINA: Status: ACTIVE | Noted: 2023-08-21

## 2023-08-21 LAB
ANION GAP SERPL CALCULATED.3IONS-SCNC: 12 MMOL/L (ref 7–15)
ANION GAP SERPL CALCULATED.3IONS-SCNC: 12 MMOL/L (ref 7–15)
ATRIAL RATE - MUSE: 99 BPM
BUN SERPL-MCNC: 9.7 MG/DL (ref 8–23)
CALCIUM SERPL-MCNC: 8.6 MG/DL (ref 8.8–10.2)
CHLORIDE SERPL-SCNC: 105 MMOL/L (ref 98–107)
CHLORIDE SERPL-SCNC: 105 MMOL/L (ref 98–107)
CREAT SERPL-MCNC: 0.63 MG/DL (ref 0.51–0.95)
DEPRECATED HCO3 PLAS-SCNC: 22 MMOL/L (ref 22–29)
DEPRECATED HCO3 PLAS-SCNC: 22 MMOL/L (ref 22–29)
DIASTOLIC BLOOD PRESSURE - MUSE: NORMAL MMHG
ERYTHROCYTE [DISTWIDTH] IN BLOOD BY AUTOMATED COUNT: 14.7 % (ref 10–15)
GFR SERPL CREATININE-BSD FRML MDRD: 85 ML/MIN/1.73M2
GLUCOSE SERPL-MCNC: 120 MG/DL (ref 70–99)
HCT VFR BLD AUTO: 34.1 % (ref 35–47)
HGB BLD-MCNC: 11 G/DL (ref 11.7–15.7)
INTERPRETATION ECG - MUSE: NORMAL
MAGNESIUM SERPL-MCNC: 1.8 MG/DL (ref 1.7–2.3)
MCH RBC QN AUTO: 28.5 PG (ref 26.5–33)
MCHC RBC AUTO-ENTMCNC: 32.3 G/DL (ref 31.5–36.5)
MCV RBC AUTO: 88 FL (ref 78–100)
P AXIS - MUSE: 62 DEGREES
PHOSPHATE SERPL-MCNC: 2.7 MG/DL (ref 2.5–4.5)
PLATELET # BLD AUTO: 291 10E3/UL (ref 150–450)
POTASSIUM SERPL-SCNC: 3.5 MMOL/L (ref 3.4–5.3)
POTASSIUM SERPL-SCNC: 3.5 MMOL/L (ref 3.4–5.3)
PR INTERVAL - MUSE: 162 MS
QRS DURATION - MUSE: 86 MS
QT - MUSE: 352 MS
QTC - MUSE: 451 MS
R AXIS - MUSE: -15 DEGREES
RBC # BLD AUTO: 3.86 10E6/UL (ref 3.8–5.2)
SODIUM SERPL-SCNC: 139 MMOL/L (ref 136–145)
SODIUM SERPL-SCNC: 139 MMOL/L (ref 136–145)
SYSTOLIC BLOOD PRESSURE - MUSE: NORMAL MMHG
T AXIS - MUSE: 78 DEGREES
VENTRICULAR RATE- MUSE: 99 BPM
WBC # BLD AUTO: 14.7 10E3/UL (ref 4–11)

## 2023-08-21 PROCEDURE — 97535 SELF CARE MNGMENT TRAINING: CPT | Mod: GO

## 2023-08-21 PROCEDURE — 99232 SBSQ HOSP IP/OBS MODERATE 35: CPT | Performed by: HOSPITALIST

## 2023-08-21 PROCEDURE — 36415 COLL VENOUS BLD VENIPUNCTURE: CPT | Performed by: STUDENT IN AN ORGANIZED HEALTH CARE EDUCATION/TRAINING PROGRAM

## 2023-08-21 PROCEDURE — 97116 GAIT TRAINING THERAPY: CPT | Mod: GP

## 2023-08-21 PROCEDURE — 258N000003 HC RX IP 258 OP 636: Performed by: STUDENT IN AN ORGANIZED HEALTH CARE EDUCATION/TRAINING PROGRAM

## 2023-08-21 PROCEDURE — G0463 HOSPITAL OUTPT CLINIC VISIT: HCPCS

## 2023-08-21 PROCEDURE — 83735 ASSAY OF MAGNESIUM: CPT | Performed by: STUDENT IN AN ORGANIZED HEALTH CARE EDUCATION/TRAINING PROGRAM

## 2023-08-21 PROCEDURE — 97166 OT EVAL MOD COMPLEX 45 MIN: CPT | Mod: GO

## 2023-08-21 PROCEDURE — 120N000001 HC R&B MED SURG/OB

## 2023-08-21 PROCEDURE — 84100 ASSAY OF PHOSPHORUS: CPT | Performed by: STUDENT IN AN ORGANIZED HEALTH CARE EDUCATION/TRAINING PROGRAM

## 2023-08-21 PROCEDURE — 250N000011 HC RX IP 250 OP 636: Mod: JZ | Performed by: STUDENT IN AN ORGANIZED HEALTH CARE EDUCATION/TRAINING PROGRAM

## 2023-08-21 PROCEDURE — 250N000013 HC RX MED GY IP 250 OP 250 PS 637: Performed by: STUDENT IN AN ORGANIZED HEALTH CARE EDUCATION/TRAINING PROGRAM

## 2023-08-21 PROCEDURE — 85027 COMPLETE CBC AUTOMATED: CPT | Performed by: STUDENT IN AN ORGANIZED HEALTH CARE EDUCATION/TRAINING PROGRAM

## 2023-08-21 PROCEDURE — 97161 PT EVAL LOW COMPLEX 20 MIN: CPT | Mod: GP

## 2023-08-21 PROCEDURE — 82310 ASSAY OF CALCIUM: CPT | Performed by: STUDENT IN AN ORGANIZED HEALTH CARE EDUCATION/TRAINING PROGRAM

## 2023-08-21 RX ADMIN — LEVOFLOXACIN 500 MG: 500 INJECTION, SOLUTION INTRAVENOUS at 16:55

## 2023-08-21 RX ADMIN — SODIUM CHLORIDE, PRESERVATIVE FREE: 5 INJECTION INTRAVENOUS at 10:08

## 2023-08-21 RX ADMIN — LOSARTAN POTASSIUM 100 MG: 50 TABLET, FILM COATED ORAL at 08:42

## 2023-08-21 RX ADMIN — SENNOSIDES AND DOCUSATE SODIUM 1 TABLET: 50; 8.6 TABLET ORAL at 08:42

## 2023-08-21 RX ADMIN — AMLODIPINE BESYLATE 2.5 MG: 2.5 TABLET ORAL at 08:41

## 2023-08-21 RX ADMIN — HEPARIN SODIUM 5000 UNITS: 10000 INJECTION, SOLUTION INTRAVENOUS; SUBCUTANEOUS at 20:46

## 2023-08-21 RX ADMIN — HEPARIN SODIUM 5000 UNITS: 10000 INJECTION, SOLUTION INTRAVENOUS; SUBCUTANEOUS at 08:42

## 2023-08-21 RX ADMIN — SENNOSIDES AND DOCUSATE SODIUM 2 TABLET: 50; 8.6 TABLET ORAL at 20:47

## 2023-08-21 ASSESSMENT — ACTIVITIES OF DAILY LIVING (ADL)
ADLS_ACUITY_SCORE: 27
ADLS_ACUITY_SCORE: 23
ADLS_ACUITY_SCORE: 25
ADLS_ACUITY_SCORE: 23
ADLS_ACUITY_SCORE: 27
ADLS_ACUITY_SCORE: 25
ADLS_ACUITY_SCORE: 27
PREVIOUS_RESPONSIBILITIES: MEAL PREP;HOUSEKEEPING;LAUNDRY;MEDICATION MANAGEMENT

## 2023-08-21 NOTE — DISCHARGE INSTRUCTIONS
RLE - Daily  Cleanse with Vashe (moisten gauze with Vashe and place over wound for 10 minutes)  Lightly moisten 2 x 2 gauze with Vashe and place over wound bed.  Secure with dry roll gauze.

## 2023-08-21 NOTE — PLAN OF CARE
Problem: Plan of Care - These are the overarching goals to be used throughout the patient stay.    Goal: Absence of Hospital-Acquired Illness or Injury  Intervention: Identify and Manage Fall Risk  Recent Flowsheet Documentation  Taken 8/20/2023 2313 by Kourtney Benitez RN  Safety Promotion/Fall Prevention:   activity supervised   mobility aid in reach   nonskid shoes/slippers when out of bed   room door open   room near nurse's station   safety round/check completed  Intervention: Prevent Skin Injury  Recent Flowsheet Documentation  Taken 8/20/2023 2313 by Kourtney Benitez RN  Body Position: position changed independently  Intervention: Prevent and Manage VTE (Venous Thromboembolism) Risk  Recent Flowsheet Documentation  Taken 8/20/2023 2313 by Kourtney Benitez RN  VTE Prevention/Management: (did not apply d/t blister)   other (see comments)   SCDs (sequential compression devices) off   Goal Outcome Evaluation:  Pat A&Ox4. Room air. No tele. Assist of 1 with walker. Pain to LLE with ambulation. Denied any need for tylenol.

## 2023-08-21 NOTE — PROGRESS NOTES
08/21/23 1005   Appointment Info   Signing Clinician's Name / Credentials (OT) Nicole Butts OT   Living Environment   People in Home alone   Current Living Arrangements independent living facility   Home Accessibility no concerns   Transportation Anticipated family or friend will provide   Living Environment Comments was independent w/her ADLs and mobility   Self-Care   Usual Activity Tolerance good   Current Activity Tolerance fair   Equipment Currently Used at Home walker, rolling;grab bar, toilet;grab bar, tub/shower;raised toilet seat   Fall history within last six months yes   Number of times patient has fallen within last six months 1   Instrumental Activities of Daily Living (IADL)   Previous Responsibilities meal prep;housekeeping;laundry;medication management  (family does all home related IADLs)   General Information   Onset of Illness/Injury or Date of Surgery 08/20/23   Referring Physician Dr Baer   Patient/Family Therapy Goal Statement (OT) go home   Additional Occupational Profile Info/Pertinent History of Current Problem Venita Renee is a 88 year old female admitted on 8/20/2023. Patient [facility] a [facility] from [facility] for right lower leg swelling, redness.   Existing Precautions/Restrictions fall   Limitations/Impairments other (see comments)  (none)   Left Upper Extremity (Weight-bearing Status) full weight-bearing (FWB)   Right Upper Extremity (Weight-bearing Status) full weight-bearing (FWB)   Left Lower Extremity (Weight-bearing Status) full weight-bearing (FWB)   Right Lower Extremity (Weight-bearing Status) full weight-bearing (FWB)   Cognitive Status Examination   Orientation Status orientation to person, place and time   Visual Perception   Visual Impairment/Limitations corrective lenses for reading   Visual Field Deficit other (see comments)   Impact of Vision Impairment on Function (Vision) mac. degen   Sensory   Sensory Quick Adds sensation intact   Pain Assessment    Patient Currently in Pain No   Posture   Posture forward head position   Range of Motion Comprehensive   General Range of Motion no range of motion deficits identified   Strength Comprehensive (MMT)   General Manual Muscle Testing (MMT) Assessment no strength deficits identified   Muscle Tone Assessment   Muscle Tone Quick Adds No deficits were identified   Coordination   Upper Extremity Coordination No deficits were identified   Bed Mobility   Bed Mobility supine-sit   Supine-Sit [place] (Bed Mobility) contact guard   Assistive Device (Bed Mobility) bed rails   Transfers   Transfers sit-stand transfer   Transfer Comments CGA   Sit-Stand Transfer   Sit-Stand [place] (Transfers) contact guard   Assistive Device (Sit-Stand Transfers) walker, front-wheeled   Activities of Daily Living   BADL Assessment/Intervention lower body dressing   Lower Body Dressing Assessment/Training   Position (Lower Body Dressing) unsupported sitting;unsupported standing   Assistive Devices (Lower Body Dressing) other (see comments)  (none)   [place] Level (Lower Body Dressing) contact guard assist   Clinical Impression   Criteria for Skilled Therapeutic Interventions Met (OT) Yes, treatment indicated   OT Diagnosis acute cellulitis   Influenced by the following impairments fatigue, decreased ADLs/mobility   OT Problem List-Impairments impacting ADL balance   Assessment of Occupational Performance 3-5 Performance Deficits   Identified Performance Deficits fatigue, decreased ADLs/balance   Planned Therapy Interventions (OT) ADL retraining   Clinical Decision Making Complexity (OT) moderate complexity   Anticipated Equipment Needs Upon Discharge (OT) shower chair   Risk & Benefits of therapy have been explained evaluation/treatment results reviewed;care plan/treatment goals reviewed;participants voiced agreement with care plan   OT Total Evaluation Time   OT Eval, Moderate Complexity Minutes (05616) 15   OT Goals   Therapy  Frequency (OT) Daily   OT Predicted Duration/Target Date for Goal Attainment 08/27/23   OT Goals Hygiene/Grooming;Lower Body Dressing;Toilet Transfer/Toileting   OT: Hygiene/Grooming modified independent   OT: Lower Body Dressing Modified independent   OT: Toilet Transfer/Toileting Modified independent   Interventions   Interventions Quick Adds Self-Care/Home Management   Self-Care/Home Management   Self-Care/Home Mgmt/ADL, Compensatory, Meal Prep Minutes (41822) 10   Symptoms Noted During/After Treatment (Meal Preparation/Planning Training) fatigue   Treatment Detail/Skilled Intervention transfers/toileting CGA w/RW anc cues for wlaker safety, LB dress CGA sit/standing unsupported, G/H CGA standing at sink w/initial cues only   OT Discharge Planning   OT Plan transfers/toileting, G/H, LB dress   OT Discharge Recommendation (DC Rec) home with home care occupational therapy   OT Rationale for DC Rec pt is safe to return home w/home OT eval for home safety   OT Brief overview of current status CGA w/ADLs and mobility   Total Session Time   Timed Code Treatment Minutes 10   Total Session Time (sum of timed and untimed services) 25

## 2023-08-21 NOTE — PROGRESS NOTES
08/21/23 0959   Appointment Info   Signing Clinician's Name / Credentials (PT) Charlette Randolph DPT   Living Environment   People in Home alone   Current Living Arrangements independent living facility   Home Accessibility no concerns   Transportation Anticipated family or friend will provide   Self-Care   Usual Activity Tolerance good   Current Activity Tolerance moderate   Equipment Currently Used at Home walker, rolling  (4WW)   Fall history within last six months yes   Number of times patient has fallen within last six months 1   General Information   Onset of Illness/Injury or Date of Surgery 08/20/23   Referring Physician Gondal, Saad J, MD   Patient/Family Therapy Goals Statement (PT) return home   Pertinent History of Current Problem (include personal factors and/or comorbidities that impact the POC) 88 year old female with HTN who presented for evaluation of leg erythema and swelling. Redness and swelling right lower extremity/acute cellulitis  Blister/bulla near the ankle   Strength (Manual Muscle Testing)   Strength (Manual Muscle Testing) Deficits observed during functional mobility   Strength Comments Due to pain.   Bed Mobility   Comment, (Bed Mobility) Pt up in the chair when PT arrived.   Transfers   Transfers sit-stand transfer   Sit-Stand Transfer   Sit-Stand [place] (Transfers) contact guard   Assistive Device (Sit-Stand Transfers) walker, front-wheeled   Gait/Stairs (Locomotion)   [place] Level (Gait) contact guard   Assistive Device (Gait) walker, front-wheeled   Distance in Feet 15'   Pattern (Gait) step-through   Deviations/Abnormal Patterns (Gait) antalgic   Clinical Impression   Criteria for Skilled Therapeutic Intervention Yes, treatment indicated   PT Diagnosis (PT) Impaired functional mobility   Influenced by the following impairments Weakness, pain   Functional limitations due to impairments Impaired strength, transfers, gait   Clinical Presentation (PT Evaluation Complexity)  Stable/Uncomplicated   Clinical Presentation Rationale Presents as diagnosed   Clinical Decision Making (Complexity) low complexity   Planned Therapy Interventions (PT) bed mobility training;gait training;home exercise program;strengthening;transfer training   Anticipated Equipment Needs at Discharge (PT) walker, rolling  (4WW)   Risk & Benefits of therapy have been explained patient   PT Total Evaluation Time   PT Eval, Low Complexity Minutes (56711) 10   Physical Therapy Goals   PT Frequency Daily   PT Predicted Duration/Target Date for Goal Attainment 08/28/23   PT Goals Bed Mobility;Transfers;Gait   PT: Bed Mobility Independent;Supine to/from sit   PT: Transfers Modified independent;Sit to/from stand;Bed to/from chair;Assistive device   PT: Gait Supervision/stand-by assist;Rolling walker;150 feet   PT Discharge Planning   PT Plan progress transfers, amb with 4WW (bring), HEP   PT Discharge Recommendation (DC Rec) home   PT Rationale for DC Rec Pt appears close to baseline mobiity. Has 4WW at home.   PT Brief overview of current status Amb 325' with FWW and CGA.   Total Session Time   Total Session Time (sum of timed and untimed services) 10       Charlette Randolph, PT, DPT  8/21/2023

## 2023-08-21 NOTE — PLAN OF CARE
Problem: Skin or Soft Tissue Infection  Goal: Absence of Infection Signs and Symptoms  Outcome: Progressing   Goal Outcome Evaluation:       Patient has been afebrile.  RLE reddened with erythema. Fluid filled blister noted on right ankle.  WOC to see.  Patient denies pain.

## 2023-08-21 NOTE — UTILIZATION REVIEW
Admission Status; Secondary Review Determination   Under the authority of the Utilization Management Committee, the utilization review process indicated a secondary review on Venita Renee. The review outcome is based on review of the medical records, discussions with staff, and applying clinical experience noted on the date of the review.   (x) Inpatient Status Appropriate - This patient's medical care is consistent with medical management for inpatient care and reasonable inpatient medical practice.     RATIONALE FOR DETERMINATION   Venita mora an 88 yr old female with HTN who presented with leg redness and swelling.  Acute cellulitis with blister and tachycardia with leukocytosis.  Ongoing IV abx.  WBC remains elevated.      At the time of admission with the information available to the attending physician more than 2 nights Hospital complex care was anticipated, based on patient risk of adverse outcome if treated as outpatient and complex care required. Inpatient admission is appropriate based on the Medicare guidelines.   The information on this document is developed by the utilization review team in order for the business office to ensure compliance. This only denotes the appropriateness of proper admission status and does not reflect the quality of care rendered.   The definitions of Inpatient Status and Observation Status used in making the determination above are those provided in the CMS Coverage Manual, Chapter 1 and Chapter 6, section 70.4.   Sincerely,   Carol Reyes MD  Utilization Review  Physician Advisor  [facility]

## 2023-08-21 NOTE — CONSULTS
[facility]  WOC Nurse Inpatient Assessment     Consulted for: RLE    Summary: Patient had scratched herself and then was going into the pool for swim class. Patient now has cellulitis in her LLE.    Patient History (according to provider note(s):        Assessment:    Wound location: RLE    Wound due to: Trauma  Wound history/plan of care: See summary above; Mechanical debridement used to remove top layer of non-viable tissue  Wound base: 100 % non-granular tissue, viable tissue with scattered areas of fibrin     Palpation of the wound bed: normal and painful       Drainage: small     Description of drainage: purulent     Measurements (length x width x depth, in cm): 3 cm  x 1 cm  x  0.2 cm      Tunneling: N/A     Undermining: N/A  Periwound skin:  Purplish-red discoloration      Color: normal and consistent with surrounding tissue      Temperature: normal   Odor: none  Pain: during dressing change and increased with palpation/wound manipulation , sharp  Pain interventions prior to dressing change: patient tolerated well and slow and gentle cares   Treatment goal: Heal   STATUS: initial assessment  Supplies ordered: ordered Vashe    Treatment Plan:     RLE - Daily  Cleanse with Vashe (moisten gauze with Vashe and place over wound for 10 minutes)  Lightly moisten 2 x 2 gauze with Vashe and place over wound bed.  Secure with dry roll gauze.    Orders: Written    RECOMMEND PRIMARY TEAM ORDER: None, at this time  Education provided: plan of care  Discussed plan of care with: Patient  WOC nurse follow-up plan: weekly  Notify WOC if wound(s) deteriorate.  Nursing to notify the Provider(s) and re-consult the WOC Nurse if new skin concern.    DATA:     Current support surface: Standard  Standard gel/foam mattress (IsoFlex, Atmos air, etc)  Containment of urine/stool: Continent of bladder and Continent of bowel  BMI: Body mass index is 22.95 kg/m .   Active diet order: Orders Placed This Encounter       Combination Diet Regular Diet Adult     Output: I/O last 3 completed shifts:  In: 1356 [P.O.:600; I.V.:756]  Out: -      Labs:   Recent Labs   Lab 08/21/23  0637   HGB 11.0*   WBC 14.7*     Pressure injury risk assessment:   Sensory Perception: 3-->slightly limited  Moisture: 4-->rarely moist  Activity: 3-->walks occasionally  Mobility: 3-->slightly limited  Nutrition: 3-->adequate  Friction and Shear: 2-->potential problem  Breezy Score: 18    Sherri Godoy MSN RN CWOCN  Pager no longer is use, please contact through [employer] group: [facility] [employer]

## 2023-08-21 NOTE — PLAN OF CARE
"Goal Outcome Evaluation:         PRIMARY DIAGNOSIS: \"GENERIC\" NURSING  OUTPATIENT/OBSERVATION GOALS TO BE MET BEFORE DISCHARGE:  ADLs back to baseline: No    Activity and level of assistance: Up with standby assistance.    Pain status: Improved with use of alternative comfort measures i.e.: just hurts when she's moving/touching it    Return to near baseline physical activity: No     Discharge Planner Nurse   Safe discharge environment identified: No  Barriers to discharge: Yes       Entered by: Pura Vallejo RN 08/20/2023 10:53 PM     Please review provider order for any additional goals.   Nurse to notify provider when observation goals have been met and patient is ready for discharge.    Pt is on IV levaquin, having an MRI tonight of her leg, checking am labs.  Pt up with 1 assist and walker, painful to walk, leg is swollen and bright blotchy red with a blister.  Blood cultures were drawn.  Pura Vallejo RN             "

## 2023-08-21 NOTE — H&P
[facility]    History and Physical - Hospitalist Service       Date of Admission:  8/20/2023    Assessment & Plan    Assessment:  Venita Renee is a 88 year old female admitted on 8/20/2023. Patient presented as a transfer from [facility] clinic for right lower leg swelling, redness.  As per patient she sustained a scratch on the right lower extremity a couple of days back, she stated that she enjoys going to the pool and stated has bumped her legs before but goes to the pool anyway, stated the scratch became red and developed some swelling and pain to the right lower leg, as her lower extremity redness and swelling was worsening and she was developing blister she decided to go to the urgency clinic where she received fluids and levofloxacin for cellulitis, labs in the urgency clinic was significant for leukocytosis of 18.8, otherwise labs unremarkable, blood cultures were drawn, as patient had bullous lesions near the ankle the urgency clinic physician was concerned about deeper infection so he decided to transfer the patient for further evaluation.    Problem list and plan:  Worsening redness and swelling right lower extremity/acute cellulitis  Blister/bulla near the ankle rule out deep tissue infection/osteomyelitis  Leukocytosis most likely in setting of cellulitis  Was transferred from urgency clinic for right lower extremity redness and swelling and blisters near the ankle joint  Found to have leukocytosis  Blood cultures were drawn  Received 1 dose of levofloxacin in urgency clinic, as patient is allergic to penicillin and cephalosporins we will continue with levofloxacin starting tomorrow  Continue with gentle IV hydration  Continue with pain management and antiemetics as appropriate  Follow-up MRI tibia-fibula to rule out deep tissue infection/osteomyelitis    History of hypertension  Continue with amlodipine and losartan     Physical deconditioning  PT OT evaluation  Fall  precautions    CODE STATUS: DNR DNI: Discussed with the patient at length at bedside and as per patient declined resuscitation or intubation.    Barriers to discharge: Currently being managed for acute cellulitis, would need to rule out deep tissue infection/osteomyelitis, will continue with IV antibiotics and may need 24 to 48 hours in the hospital       Diet: Room Service  Combination Diet Regular Diet Adult    DVT Prophylaxis: Heparin SQ  Jean Catheter: Not present  Lines: None     Cardiac Monitoring: None  Code Status: No CPR- Do NOT Intubate    Mental status: Patient is alert awake and oriented x3, patient was a good historian most of the history was obtained from the patient, some history was obtained from chart review and rest of the history was obtained from discussion with the urgency clinic physician    Clinically Significant Risk Factors Present on Admission                  # Hypertension: Home medication list includes antihypertensive(s)                 Disposition Plan      Expected Discharge Date: 08/22/2023                  Saad J. Gondal, MD  Hospitalist Service  [facility]  Securely message with [employer] (more info)  Text page via [employer] Paging/Directory     ______________________________________________________________________    Chief Complaint   Right lower extremity swelling, redness, blister/bulla near the ankle    History is obtained from the patient    History of Present Illness   Venita eRnee is a 88 year old female with a past medical history as below who presented as a transfer from [facility] clinic for right lower leg swelling, redness.  As per patient she sustained a scratch on the right lower extremity a couple of days back, she stated that she enjoys going to the pool and stated has bumped her legs before but goes to the pool anyway, stated the scratch became red and developed some swelling and pain to the right lower leg, as her lower extremity redness and  swelling was worsening and she was developing blister she decided to go to the urgency clinic where she received fluids and levofloxacin for cellulitis, labs in the urgency clinic was significant for leukocytosis of 18.8, otherwise labs unremarkable, blood cultures were drawn, as patient had bullous lesions near the ankle the urgency clinic physician was concerned about deeper infection so he decided to transfer the patient for further evaluation.      Past Medical History    Past Medical History:   Diagnosis Date    Gastroesophageal reflux disease     Hypertension     Macular degeneration        Past Surgical History   No past surgical history on file.    Prior to Admission Medications   Prior to Admission Medications   Prescriptions Last Dose Informant Patient Reported? Taking?   CINNAMON PO 8/20/2023 at am  Yes Yes   Sig: Take 1 capsule by mouth daily   GARLIC PO 8/20/2023 at am  Yes Yes   Sig: Take 1 capsule by mouth daily   Multiple Vitamins-Minerals (PRESERVISION AREDS) CAPS 8/20/2023?? at am  Yes Yes   Sig: Take 1 capsule by mouth 2 times daily   Vitamin E 90 MG (200 UNIT) capsule 8/20/2023 at am  Yes Yes   Sig: Take 200 Units by mouth daily   amLODIPine (NORVASC) 2.5 MG tablet 8/20/2023?? at am  Yes Yes   Sig: Take 2.5 mg by mouth daily   calcium carbonate-vitamin D (OSCAL) 500-5 MG-MCG tablet 8/20/2023 at am  Yes Yes   Sig: Take 1 tablet by mouth daily   losartan (COZAAR) 100 MG tablet 8/20/2023?? at am  Yes Yes   Sig: Take 100 mg by mouth daily   multivitamin w/minerals (MULTI-VITAMIN) tablet 8/20/2023 at am  Yes Yes   Sig: Take 1 tablet by mouth daily   omeprazole (PRILOSEC) 20 MG DR capsule 8/20/2023?? at am  Yes Yes   Sig: Take 20 mg by mouth daily before breakfast      Facility-Administered Medications: None        Review of Systems    The 10 point Review of Systems is negative other than noted in the HPI or here.  Right lower extremity swelling, redness, blister/bulla near the ankle    Physical Exam    Vital Signs: Temp: 97.8  F (36.6  C) Temp src: Oral BP: 132/71 Pulse: 102   Resp: 16 SpO2: 94 % O2 Device: None (Room air)    Weight: 117 lbs 8.08 oz    GENERAL: Patient was seen and examined at bedside with no acute distress  HENT:  Head is normocephalic, atraumatic.  Sunken eyes, pale conjunctival mucosa  EYES:  Eyes have anicteric sclerae without conjunctival injection   LUNGS: Bilateral air entry, decreased breath sounds to the bilateral bases  CARDIOVASCULAR:  Regular rate and rhythm with no murmurs, gallops or rubs.  ABDOMEN:  Normal bowel sounds. Soft; nontender   EXT: no edema, right lower extremity redness, swelling, warmth, blistering near the ankle joint, redness from ankle to below the knee  SKIN:  No acute rashes.  Dry scaly wrinkled skin, poor skin turgor  NEUROLOGIC:  Grossly nonfocal.      Medical Decision Making       80 MINUTES SPENT BY ME on the date of service doing chart review, history, exam, documentation & further activities per the note.      Data         Imaging results reviewed over the past 24 hrs:   No results found for this or any previous visit (from the past 24 hour(s)).

## 2023-08-21 NOTE — PROGRESS NOTES
Care Management Initial Consult    General Information  Assessment completed with: Patient,    Type of CM/SW Visit: CM Role Introduction    Primary Care Provider verified and updated as needed: Yes   Readmission within the last 30 days:   no        Advance Care Planning: Advance Care Planning Reviewed: questions answered  Pt will bring in copy       Communication Assessment  Patient's communication style: spoken language (English or Bilingual)    Hearing Difficulty or Deaf: no   Wear Glasses or Blind: yes    Cognitive  Cognitive/Neuro/Behavioral: WDL                      Living Environment:   People in home: alone     Current living Arrangements: apartment      Able to return to prior arrangements: yes       Family/Social Support:  Care provided by: self, child(michael)  Provides care for: no one  Marital Status:   Children, Neighbor          Description of Support System: Supportive, Involved    Support Assessment: Adequate family and caregiver support    Current Resources:   Patient receiving home care services: No     Community Resources: None  Equipment currently used at home: walker, rolling, grab bar, toilet, grab bar, tub/shower, raised toilet seat  Supplies currently used at home:      Employment/Financial:  Employment Status: retired     Employment/ Comments: no  history  Financial Concerns:     Referral to Financial Worker: No       Does the patient's insurance plan have a 3 day qualifying hospital stay waiver?  No    Lifestyle & Psychosocial Needs:  Social Determinants of Health     Tobacco Use: Not on file   Alcohol Use: Not on file   Financial Resource Strain: Not on file   Food Insecurity: Not on file   Transportation Needs: Not on file   Physical Activity: Not on file   Stress: Not on file   Social Connections: Not on file   Intimate Partner Violence: Not on file   Depression: Not on file   Housing Stability: Not on file       Functional Status:  Prior to admission patient needed  assistance: Transportation, banking       Mental Health Status:  Mental Health Status: No Current Concerns       Chemical Dependency Status:  Chemical Dependency Status: No Current Concerns             Values/Beliefs:  Spiritual, Cultural Beliefs, Amish Practices, Values that affect care:    no             Additional Information:  SW met with pt, she lives in independent [address]+ building. She has a lot of friends/neighbors and is social in her building. She goes to the pool to exercise at 5 AM almost every morning. She is independent in her apt. Pt daughter assists with banking and transportation. Family to transport at discharge.       TRINA Heath

## 2023-08-21 NOTE — PHARMACY-ADMISSION MEDICATION HISTORY
Pharmacist Admission Medication History    Admission medication history is complete. The information provided in this note is only as accurate as the sources available at the time of the update.    Medication reconciliation/reorder completed by provider prior to medication history? No    Information Source(s): Patient and CareEverywhere/SureScripts via phone    Pertinent Information: from urgent care, thinks she might of took meds this am but can't recall    Changes made to PTA medication list:  Added: all but omeprazole were added to record   Deleted: None  Changed: None    Medication Affordability:  Not including over the counter (OTC) medications, was there a time in the past 3 months when you did not take your medications as prescribed because of cost?: No    Allergies reviewed with patient and updates made in EHR: yes    Medication History Completed By: Bharati Andrew [facility] 8/20/2023 7:59 PM    Prior to Admission medications    Medication Sig Last Dose Taking? Auth Provider Long Term End Date   amLODIPine (NORVASC) 2.5 MG tablet Take 2.5 mg by mouth daily 8/20/2023?? at am Yes Unknown, Entered By History Yes    calcium carbonate-vitamin D (OSCAL) 500-5 MG-MCG tablet Take 1 tablet by mouth daily 8/20/2023 at am Yes Unknown, Entered By History     CINNAMON PO Take 1 capsule by mouth daily 8/20/2023 at am Yes Unknown, Entered By History     GARLIC PO Take 1 capsule by mouth daily 8/20/2023 at am Yes Unknown, Entered By History     losartan (COZAAR) 100 MG tablet Take 100 mg by mouth daily 8/20/2023?? at am Yes Unknown, Entered By History Yes    Multiple Vitamins-Minerals (PRESERVISION AREDS) CAPS Take 1 capsule by mouth 2 times daily 8/20/2023?? at am Yes Unknown, Entered By History     multivitamin w/minerals (MULTI-VITAMIN) tablet Take 1 tablet by mouth daily 8/20/2023 at am Yes Unknown, Entered By History     omeprazole (PRILOSEC) 20 MG DR capsule Take 20 mg by mouth daily before breakfast 8/20/2023?? at am  Yes Unknown, Entered By History     Vitamin E 90 MG (200 UNIT) capsule Take 200 Units by mouth daily 8/20/2023 at am Yes Unknown, Entered By History

## 2023-08-21 NOTE — PROGRESS NOTES
[facility]    Medicine Progress Note - Hospitalist Service    Date of Admission:  8/20/2023    Assessment & Plan                Venita Renee is a 88 year old female with HTN who presented for evaluation of leg erythema and swelling. Hospital Day: 2     Redness and swelling right lower extremity/acute cellulitis  Blister/bulla near the ankle  Was transferred from urgency clinic for right lower extremity redness and swelling and blisters near the ankle joint  From history, sounds like it began with a scratch from her fingernail while she was at the pool.  History of multiple episodes of cellulitis of the fingers.  Has not previously had cellulitis of the LE.  Found to have leukocytosis, but not meeting sepsis criteria  Blood cultures no growth to date  MRI tibia-fibula reassuring with no abscess, subcutaneous emphysema, or osteomyelitis  Received 1 dose of levofloxacin in urgency clinic, as patient is allergic to penicillin and cephalosporins we will continue with levofloxacin.  Seems to be responding well so far  Can discontinue IV hydration  Continue with pain management and antiemetics as appropriate  Photo of leg taken for clinical record today.  If continues to improve, possible discharge home on oral levofloxacin tomorrow.  Trend WBC  In my opinion, no contraindication to ongoing usage of the pool when current episode has resolved.  Suspect regular exercise is likely a contributor to her longevity and excellent health.     History of hypertension  Continue with amlodipine and losartan      Physical deconditioning  PT OT evaluation  Fall precautions    Macular degeneration  Appreciates having the room well-lit  Nursing assistance as needed       DVT Prophylaxis: Moderate risk. SQH  Diet: Room Service  Combination Diet Regular Diet Adult    Jean Catheter: Not present  Lines: None     Cardiac Monitoring: None  Code Status: No CPR- Do NOT Intubate      Clinically Significant Risk Factors  Present on Admission                  # Hypertension: Home medication list includes antihypertensive(s)                 Disposition Plan   Disposition: Home     Expected Discharge Date: 08/22/2023        Discharge Comments: IV abx     Medically ready to discharge today: No     The patient's care was discussed with the Patient.    Sahara Baer MD  Hospitalist Service  [facility]  Securely message with [employer] (more info)  Text page via [employer] Paging/Directory   ______________________________________________________________________      Physical Exam   Vital Signs: Temp: 98.4  F (36.9  C) Temp src: Oral BP: 118/61 Pulse: 95   Resp: 16 SpO2: 93 % O2 Device: None (Room air)    Weight: 117 lbs 8.08 oz  General: in no apparent distress, non-toxic, and alert female sitting in bedside chair oriented x3  HEENT: Head normocephalic atraumatic, oral mucosa moist. Sclerae anicteric  CV: Regular rhythm, normal rate, no murmurs  Resp: No wheezes, no rales or rhonchi, no focal consolidations  Skin:  irregular areas of dense erythema RLE, some intact blistering around medial distal shin . Sock feels damp to the touch  Extremities:  trace edema RLE  Psych: Normal affect, mood euthymic  Neuro: Grossly normal          Medical Decision Making               Data   Recent Results (from the past 12 hour(s))   Basic metabolic panel    Collection Time: 08/21/23  6:37 AM   Result Value Ref Range    Sodium 139 136 - 145 mmol/L    Potassium 3.5 3.4 - 5.3 mmol/L    Chloride 105 98 - 107 mmol/L    Carbon Dioxide (CO2) 22 22 - 29 mmol/L    Anion Gap 12 7 - 15 mmol/L    Urea Nitrogen 9.7 8.0 - 23.0 mg/dL    Creatinine 0.63 0.51 - 0.95 mg/dL    Calcium 8.6 (L) 8.8 - 10.2 mg/dL    Glucose 120 (H) 70 - 99 mg/dL    GFR Estimate 85 >60 mL/min/1.73m2   Electrolyte panel    Collection Time: 08/21/23  6:37 AM   Result Value Ref Range    Sodium 139 136 - 145 mmol/L    Potassium 3.5 3.4 - 5.3 mmol/L    Chloride 105 98 - 107 mmol/L     Carbon Dioxide (CO2) 22 22 - 29 mmol/L    Anion Gap 12 7 - 15 mmol/L   CBC with platelets    Collection Time: 08/21/23  6:37 AM   Result Value Ref Range    WBC Count 14.7 (H) 4.0 - 11.0 10e3/uL    RBC Count 3.86 3.80 - 5.20 10e6/uL    Hemoglobin 11.0 (L) 11.7 - 15.7 g/dL    Hematocrit 34.1 (L) 35.0 - 47.0 %    MCV 88 78 - 100 fL    MCH 28.5 26.5 - 33.0 pg    MCHC 32.3 31.5 - 36.5 g/dL    RDW 14.7 10.0 - 15.0 %    Platelet Count 291 150 - 450 10e3/uL   Magnesium    Collection Time: 08/21/23  6:37 AM   Result Value Ref Range    Magnesium 1.8 1.7 - 2.3 mg/dL   Phosphorus    Collection Time: 08/21/23  6:37 AM   Result Value Ref Range    Phosphorus 2.7 2.5 - 4.5 mg/dL     Interval History     Patient states doing a little bit better today.  Leg is still red, painful.  Photo taken from the chart.  Not ready for discharge.  Patient no other complaints.

## 2023-08-22 ENCOUNTER — APPOINTMENT (OUTPATIENT)
Dept: PHYSICAL THERAPY | Facility: HOSPITAL | Age: 88
DRG: 603 | End: 2023-08-22
Attending: STUDENT IN AN ORGANIZED HEALTH CARE EDUCATION/TRAINING PROGRAM
Payer: MEDICARE

## 2023-08-22 VITALS
HEART RATE: 61 BPM | DIASTOLIC BLOOD PRESSURE: 64 MMHG | WEIGHT: 117.5 LBS | OXYGEN SATURATION: 97 % | RESPIRATION RATE: 17 BRPM | BODY MASS INDEX: 23.07 KG/M2 | HEIGHT: 60 IN | TEMPERATURE: 97.8 F | SYSTOLIC BLOOD PRESSURE: 127 MMHG

## 2023-08-22 LAB
ERYTHROCYTE [DISTWIDTH] IN BLOOD BY AUTOMATED COUNT: 14.5 % (ref 10–15)
HCT VFR BLD AUTO: 33.2 % (ref 35–47)
HGB BLD-MCNC: 10.7 G/DL (ref 11.7–15.7)
MCH RBC QN AUTO: 28.3 PG (ref 26.5–33)
MCHC RBC AUTO-ENTMCNC: 32.2 G/DL (ref 31.5–36.5)
MCV RBC AUTO: 88 FL (ref 78–100)
PLATELET # BLD AUTO: 290 10E3/UL (ref 150–450)
RBC # BLD AUTO: 3.78 10E6/UL (ref 3.8–5.2)
WBC # BLD AUTO: 9.3 10E3/UL (ref 4–11)

## 2023-08-22 PROCEDURE — 97116 GAIT TRAINING THERAPY: CPT | Mod: GP

## 2023-08-22 PROCEDURE — 250N000011 HC RX IP 250 OP 636: Mod: JZ | Performed by: STUDENT IN AN ORGANIZED HEALTH CARE EDUCATION/TRAINING PROGRAM

## 2023-08-22 PROCEDURE — 36415 COLL VENOUS BLD VENIPUNCTURE: CPT | Performed by: HOSPITALIST

## 2023-08-22 PROCEDURE — 250N000013 HC RX MED GY IP 250 OP 250 PS 637: Performed by: STUDENT IN AN ORGANIZED HEALTH CARE EDUCATION/TRAINING PROGRAM

## 2023-08-22 PROCEDURE — 99239 HOSP IP/OBS DSCHRG MGMT >30: CPT | Performed by: HOSPITALIST

## 2023-08-22 PROCEDURE — 85027 COMPLETE CBC AUTOMATED: CPT | Performed by: HOSPITALIST

## 2023-08-22 RX ORDER — LEVOFLOXACIN 500 MG/1
500 TABLET, FILM COATED ORAL DAILY
Qty: 5 TABLET | Refills: 0 | Status: SHIPPED | OUTPATIENT
Start: 2023-08-22 | End: 2023-08-27

## 2023-08-22 RX ADMIN — LOSARTAN POTASSIUM 100 MG: 50 TABLET, FILM COATED ORAL at 09:57

## 2023-08-22 RX ADMIN — SENNOSIDES AND DOCUSATE SODIUM 1 TABLET: 50; 8.6 TABLET ORAL at 09:57

## 2023-08-22 RX ADMIN — HEPARIN SODIUM 5000 UNITS: 10000 INJECTION, SOLUTION INTRAVENOUS; SUBCUTANEOUS at 09:57

## 2023-08-22 RX ADMIN — AMLODIPINE BESYLATE 2.5 MG: 2.5 TABLET ORAL at 09:57

## 2023-08-22 ASSESSMENT — ACTIVITIES OF DAILY LIVING (ADL)
ADLS_ACUITY_SCORE: 23

## 2023-08-22 NOTE — PLAN OF CARE
Problem: Skin or Soft Tissue Infection  Goal: Absence of Infection Signs and Symptoms  Outcome: Progressing     Problem: Hypertension Comorbidity  Goal: Blood Pressure in Desired Range  Outcome: Progressing   Goal Outcome Evaluation:       Plan to discharge home today after lunch. Family will transport. Wound care education provided, supplies will go home with patient.

## 2023-08-22 NOTE — PLAN OF CARE
Physical Therapy Discharge Summary    Reason for therapy discharge:    All goals and outcomes met, no further needs identified.    Progress towards therapy goal(s). See goals on Care Plan in [facility] electronic health record for goal details.  Goals met    Therapy recommendation(s):    No further therapy is recommended.      Charlette Randolph, PT, DPT  8/22/2023

## 2023-08-22 NOTE — DISCHARGE SUMMARY
[facility] MEDICINE  DISCHARGE SUMMARY     Primary Care Physician: [facility]  Admission Date: 8/20/2023   Discharge Provider: Sahara Baer MD Discharge Date: 8/22/2023   Diet:   Active Diet and Nourishment Order   Procedures    Room Service    Combination Diet Regular Diet Adult    Diet       Code Status: No CPR- Do NOT Intubate   Activity: DCACTIVITY: Activity as tolerated        Condition at Discharge: Good     REASON FOR PRESENTATION(See Admission Note for Details)   cellulitis    PRINCIPAL & ACTIVE DISCHARGE DIAGNOSES     Principal Problem:    Acute cellulitis  Active Problems:    Primary hypertension    Macular degeneration (senile) of retina      PENDING LABS     Unresulted Labs Ordered in the Past 30 Days of this Admission       Date and Time Order Name Status Description    8/20/2023  7:27 PM Blood Culture Arm, Left Preliminary     8/20/2023  7:27 PM Blood Culture Arm, Right Preliminary             PROCEDURES ( this hospitalization only)          RECOMMENDATIONS TO OUTPATIENT PROVIDER FOR F/U VISIT     Follow-up Appointments     Follow-up and recommended labs and tests       Follow up with primary care provider, [facility],   within 3-4 days for hospital follow- up.  To re-evaluate wound and make   sure you are continuing to improve as expected            DISPOSITION     Home with home care    SUMMARY OF HOSPITAL COURSE:      Venita Renee is a 88 year old female with HTN who presented for evaluation of leg erythema and swelling. Now discharging home with home care. Hospital Day: 3     Redness and swelling right lower extremity/acute cellulitis  Blister/bulla near the ankle  Was transferred from urgency clinic for right lower extremity redness and swelling and blisters near the ankle joint  From history, sounds like it began with a scratch from her fingernail while she was at the pool.  History of multiple episodes of  cellulitis of the fingers.  Has not previously had cellulitis of the LE.  Found to have leukocytosis, but not meeting sepsis criteria  Blood cultures no growth  MRI tibia-fibula reassuring with no abscess, subcutaneous emphysema, or osteomyelitis  Started on levofloxacin as patient is allergic to penicillin and cephalosporins. Has responded well so far with normalization of WBC and her leg is feeling better. Continue Levaquin for another 5 days. Offered to monitor another day in hospital, but patient feels comfortable returning home.  Local wound cares, [facility] did see for guidance. Patient will have home nurse come out for wound care, arrangements made.     Discharge Medications with Med changes:     Current Discharge Medication List        START taking these medications    Details   levofloxacin (LEVAQUIN) 500 MG tablet Take 1 tablet (500 mg) by mouth daily for 5 days  Qty: 5 tablet, Refills: 0    Associated Diagnoses: Open wound of knee, leg, and ankle, right, initial encounter           CONTINUE these medications which have NOT CHANGED    Details   amLODIPine (NORVASC) 2.5 MG tablet Take 2.5 mg by mouth daily      calcium carbonate-vitamin D (OSCAL) 500-5 MG-MCG tablet Take 1 tablet by mouth daily      CINNAMON PO Take 1 capsule by mouth daily      GARLIC PO Take 1 capsule by mouth daily      losartan (COZAAR) 100 MG tablet Take 100 mg by mouth daily      Multiple Vitamins-Minerals (PRESERVISION AREDS) CAPS Take 1 capsule by mouth 2 times daily      multivitamin w/minerals (MULTI-VITAMIN) tablet Take 1 tablet by mouth daily      omeprazole (PRILOSEC) 20 MG DR capsule Take 20 mg by mouth daily before breakfast      Vitamin E 90 MG (200 UNIT) capsule Take 200 Units by mouth daily               Consults     PHYSICAL THERAPY ADULT IP CONSULT  OCCUPATIONAL THERAPY ADULT IP CONSULT  WOUND OSTOMY CONTINENCE NURSE  IP CONSULT        SIGNIFICANT IMAGING FINDINGS     Results for orders placed or performed during the hospital  encounter of 08/20/23   MR Tibia Fibula Lower Leg Right wo & w Contr    Impression    IMPRESSION:  1.  No evidence for osteomyelitis, septic arthropathy, or abscess.  2.  Extensive but nonspecific edema or cellulitis seen circumferentially about the lower leg. There are areas of free fluid but these are not organized into abscesses. There are areas of irregularity along the skin surface which may represent   bullous/blistering lesions.  3.  No evidence for fracture.  4.  No significant effusion at the knee or ankle joint to suggest septic arthropathy.                       SIGNIFICANT LABORATORY FINDINGS     See emr    Discharge Orders        Reason for your hospital stay    cellulitis     Follow-up and recommended labs and tests     Follow up with primary care provider, [facility], within 3-4 days for hospital follow- up.  To re-evaluate wound and make sure you are continuing to improve as expected     Activity    Your activity upon discharge: activity as tolerated     When to contact your care team    Call your primary doctor if you have any of the following: chest pain, shortness of breath, fever, chills, fainting, dizziness, vomiting, constipation, dehydration, worsening pain, bleeding, or trouble urinating, or any other symptoms that are new or concerning to you.     Discharge Instructions    While on levofloxacin, do not take your multivitamin or calcium supplement     Diet    Follow this diet upon discharge: resume your regular diet       Examination   Physical Exam   Temp:  [97.8  F (36.6  C)-98.5  F (36.9  C)] 97.8  F (36.6  C)  Pulse:  [61-87] 61  Resp:  [16-17] 17  BP: (127-140)/(59-64) 127/64  SpO2:  [92 %-97 %] 97 %  Wt Readings from Last 1 Encounters:   08/20/23 53.3 kg (117 lb 8.1 oz)       General: in no apparent distress, non-toxic, and alert female sitting in bedside chair oriented x3  HEENT: Head normocephalic atraumatic, oral mucosa moist. Sclerae anicteric  Skin:  irregular  areas of dense erythema RLE, similar to previous exam  Extremities:  trace edema RLE  Psych: Normal affect, mood euthymic  Neuro: Grossly normal    Please see EMR for more detailed significant labs, imaging, consultant notes etc.    I, Sahara Baer MD, personally saw the patient today and spent greater than 30 minutes discharging this patient.    Sahara Baer MD  [facility]    CC:Clinic, [facility]

## 2023-08-22 NOTE — PLAN OF CARE
Occupational Therapy Discharge Summary    Reason for therapy discharge:    Discharged to home with home therapy.    Progress towards therapy goal(s). See goals on Care Plan in [facility] electronic health record for goal details.  Goals not met.  Barriers to achieving goals:   discharge from facility.    Therapy recommendation(s):    Continued therapy is recommended.  Rationale/Recommendations:  decreased ADLs.    Goal Outcome Evaluation:         Mary Reyes, OTR/LUIS    8/22/2023

## 2023-08-22 NOTE — PLAN OF CARE
Goal Outcome Evaluation:       Patient took her med, pulled out her IV accidentally. New IV was placed on her right AC. Patient denies pain, has RLE redness and a wound on her right shin. Wound care done.

## 2023-08-22 NOTE — PROGRESS NOTES
Care Management Discharge Note    Discharge Date: 08/22/2023    Discharge Disposition: Home    Discharge Services:  None    Discharge DME:  None    Discharge Transportation: family will provide    Private pay costs discussed: Not applicable    Does the patient's insurance plan have a 3 day qualifying hospital stay waiver?  No    PAS Confirmation Code:    Patient/family educated on Medicare website which has current facility and service quality ratings:      Education Provided on the Discharge Plan:    Persons Notified of Discharge Plans: Nursing;   Patient/Family in Agreement with the Plan:      Handoff Referral Completed: No    Additional Information:  Discharge orders noted and reviewed. No CM needs identified.   11:34 AM : Nursing reports that patient is requesting skilled nursing visits to monitor her wound. Text page sent to MD. Reviewed with MD who agreed with a plan to make a referral to [facility].    Linda Valencia RN

## 2023-08-22 NOTE — PLAN OF CARE
Goal Outcome Evaluation:    Problem: Skin or Soft Tissue Infection  Goal: Absence of Infection Signs and Symptoms  Outcome: Progressing     Problem: Hypertension Comorbidity  Goal: Blood Pressure in Desired Range  Outcome: Progressing  Intervention: Maintain Blood Pressure Management  Recent Flowsheet Documentation  Taken 8/22/2023 0350 by Faby Irvin, RN  Medication Review/Management: medications reviewed  Taken 8/22/2023 0100 by Faby Irvin, RN  Medication Review/Management: medications reviewed          A&Ox4. Dressing on RLE CDI. Blood cultures pending. IV saline locked. Vss on RA.     /59 (BP Location: Right arm)   Pulse 87   Temp 98.5  F (36.9  C) (Oral)   Resp 16   Ht 1.524 m (5')   Wt 53.3 kg (117 lb 8.1 oz)   SpO2 92%   BMI 22.95 kg/m

## 2023-08-22 NOTE — PROGRESS NOTES
Discharged to home at 12:35. Home care to follow up with patient to monitor wound. Patient and family will perform wound care.

## 2023-08-25 LAB
BACTERIA BLD CULT: NO GROWTH
BACTERIA BLD CULT: NO GROWTH

## 2024-10-04 ENCOUNTER — TRANSCRIBE ORDERS (OUTPATIENT)
Dept: OPHTHALMOLOGY | Facility: CLINIC | Age: 89
End: 2024-10-04
Payer: MEDICARE

## 2024-10-04 DIAGNOSIS — H35.30 AMD (AGE RELATED MACULAR DEGENERATION): Primary | ICD-10-CM

## 2025-02-18 ENCOUNTER — THERAPY VISIT (OUTPATIENT)
Dept: OCCUPATIONAL THERAPY | Facility: CLINIC | Age: OVER 89
End: 2025-02-18
Attending: OPHTHALMOLOGY
Payer: MEDICARE

## 2025-02-18 DIAGNOSIS — H35.30 AMD (AGE RELATED MACULAR DEGENERATION): Primary | ICD-10-CM

## 2025-02-18 PROCEDURE — 97165 OT EVAL LOW COMPLEX 30 MIN: CPT | Mod: GO | Performed by: OCCUPATIONAL THERAPIST

## 2025-02-18 PROCEDURE — 97535 SELF CARE MNGMENT TRAINING: CPT | Mod: GO | Performed by: OCCUPATIONAL THERAPIST

## 2025-02-18 NOTE — PROGRESS NOTES
OCCUPATIONAL THERAPY EVALUATION  Type of Visit: Evaluation       Fall Risk Screen:  Fall screen completed by: OT  Have you fallen 2 or more times in the past year?: No  Have you fallen and had an injury in the past year?: No  Is patient a fall risk?: Yes  Fall screen comments: low vision and balance    Subjective        Presenting condition or subjective complaint: eye sight bad  Date of onset: 10/04/24    Relevant medical history:   AMD (age related macular degeneration) [H35.30]  - Primary  Past Medical History:   Diagnosis Date    Gastroesophageal reflux disease     Hypertension     Macular degeneration      Dates & types of surgery:    No past surgical history on file.    Prior diagnostic imaging/testing results:       Prior therapy history for the same diagnosis, illness or injury: No      Prior Level of Function  Transfers: Independent  Ambulation: Independent  ADL: Independent  IADL: Driving, Finances, Housekeeping, Laundry, Meal preparation, Medication management, drove up until she was 83yo    Living Environment  Social support: Alone   Type of home: Apartment/condo - 55+ and over  Stairs to enter the home: No       Ramp: No   Stairs inside the home: No       Help at home: None - daughter and son help with groceries, errands, appointments, transportation; patient cooks (oven, microwave, air fryer), does her own laundry, sets up own medication (only 1) and lot of vitamins, does her own cleaning - though interested in services for near future; daughter helps with finances  Equipment owned: Walker with wheels; Grab bars     Employment: No    Hobbies/Interests: swimming    Patient goals for therapy: looking for resources available to her    Pain assessment: Pain denied     Objective   LOW VISION EVALUATION  ADDITIONAL HISTORY:  Current Responsibilities - IADLS: Housekeeping, Laundry, Meal preparation, Medication management    Others present at visit: Child(michael)    COGNITIVE/BEHAVIORAL:  Communication: Impaired,  "slightly hard of hearing - hearing loss L ear, doesn't wear hearing aids  Cognitive Status: Oriented to person, place and time  Behavior: Appropriate    Physical Status/Equipment   Physical Status: decreased balance  Mobility Equipment Used: Walker (four wheeled) - uses outside the apartment  Bathing ADL Equipment Used:  grab bars  Toileting ADL Equipment Used: Grab bar    VISUAL REPORT:  Functional complaints: Avocational tasks, Homemaking, Leisure, Reading, Safety in mobility, Writing  Visual Complaints: Scotoma Hindrance right eye, Scotoma Hindrance left eye, Difficulty maintaining focus, Light sensitivity  Trey Bonnet Symptoms? Yes  Magnifiers and Low Vision AE owned: Handheld magnifier no light, doesn't work very well; magnifying mirror  Reading Glasses: Single lens (readers), +3.0 or +4.0 - not sure of power  Power per MD report:  N/A  Technology: Smart phone, Electronic tablet, ipad    LIGHTING AND GLARE:  Is your lighting adequate?  \"Pretty good\"  Is glare a problem? Yes outdoors  Are you satisfied with your sunglasses? Yes   Sunglass Details: Department store sunglasses, Gray tint; also wraparound sunglasses - for the pool, work well    VISUAL ACUITY:  Distance Acuity Right Eye: Without correction, Per recent MD report: CF 2 ft  Distance Acuity Left Eye: Without correction, Per recent MD report, CF 2 ft  Distance Acuity Both Eyes Together:  see above  Near Visual Acuity:  see below    CONTRAST SENSITIVITY:  Contrast Sensitivity (score/25): 2/25 at ~5\" (not able to see any at standard 16\" distance)    PREFERRED RETINAL LOCUS:  To be determined    MN Read  Smallest Print Size Read: At 16\" ambient light:  unable to see anything; at 16\" with task light (cool preferred and bright): still not able to see any; at any distance:  seeing only letters and short words up to 4M to 5M at ~6\" and with +4.0 readers  Critical Print Size: N/A  Words per minute at critical print size: N/A    Visual Field:  Central Visual " Field: Abnormal, R eye: saw most, however, very blurry; L eye: L side most visible  Central Visual Field Screen Used: Clock Face  Peripheral Visual Field:  see MD notes  Peripheral Visual Field Screen Used:  N/A    SRAFVP Scores:  Relevant Measures: 37  Composite Score: 43  Percentage of Disability: 41.89    Assessment & Plan   CLINICAL IMPRESSIONS  Medical Diagnosis: AMD (age related macular degeneration) (H35.30)  - Primary    Treatment Diagnosis: decreased ADL/IADL independence    Impression/Assessment: Pt is a 89 year old female presenting to Occupational Therapy due to low vision.  The following significant findings have been identified: Impaired communication, Impaired mobility, and Impaired visual perception.  These identified deficits interfere with their ability to perform self care tasks, recreational activities, household chores, driving , household mobility, community mobility, medication management, financial management, meal planning and preparation, and community or volunteer activities as compared to previous level of function.     Clinical Decision Making (Complexity):  Assessment of Occupational Performance: 5 or more Performance Deficits  Occupational Performance Limitations: bathing/showering, dressing, feeding, functional mobility, hygiene and grooming, communication management, driving and community mobility, health management and maintenance, home establishment and management, meal preparation and cleanup, shopping, and leisure activities  Clinical Decision Making (Complexity): Low complexity    PLAN OF CARE  Treatment Interventions:  Interventions: Self-Care/Home Management, Therapeutic Activity    Long Term Goals   OT Goal 1  Goal Identifier: Near Vision  Goal Description: Patient will demonstrate 3 pieces of adaptive equipment and/or adaptive techniques in regards to magnification, lighting, contrast and glare for increased ADL/IADL independence with near vision tasks (reading, meal prep,  medication management, writing).  Rationale: In order to maximize safety and independence with performance of self-care activities;In order to safely and appropriately apply compensatory strategies with ADL/IADL performance  Goal Progress: Continuous reading: audio optimal; Technology adaptations/accessibility: ipad: larger font, voice commands (Nayla)  Target Date: 08/15/25  OT Goal 2  Goal Identifier: Environmental modifications  Goal Description: Patient will demonstrate and/or verbalize 3 environmentally-based ADL modifications to improve visual-based ADL/IADL activities.  Rationale: In order to maximize safety and independence with performance of self-care activities;In order to safely and appropriately apply compensatory strategies with ADL/IADL performance  Goal Progress: initiated education in lighting: Patient prefers cool and bright  Target Date: 08/15/25  OT Goal 3  Goal Identifier: Resource Education  Goal Description: Patient will verbalize awareness of 2 community resources for increased ADL/IADL completion.  Rationale: In order to maximize safety and independence with performance of self-care activities  Goal Progress: Trey Bonnet syndrome  Target Date: 08/15/25  OT Goal 4  Goal Identifier: Distance Viewing  Goal Description: Patient will demonstrate increased independence in distance viewing for safety and leisure during ADL/IADLs through independent use of at least one adaptive technique or AE.  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  TEchnology adaptations/accessibility: ipad: larger font, extensive education in use of voice commands (Nayla) to read texts, speak texts and send texts - patient demonstrated numerous times with min to mod cues; focal distance with high power glasses and OTC glasses available;  initiated education in lighting: Patient prefers cool and bright. Continuous reading: audio optimal     Risks and benefits of evaluation/treatment have been explained.   Patient/Family/caregiver agrees with Plan of Care.     Evaluation Time:    OT Eval, Low Complexity Minutes (33319): 32  Signing Clinician: DEON Lackey [facility]                                                                                   OUTPATIENT OCCUPATIONAL THERAPY      PLAN OF TREATMENT FOR OUTPATIENT REHABILITATION   Patient's Last Name, First Name, Venita Velez YOB: 1935   Provider's Name   [facility]   Medical Record No.  [id number]     Onset Date: 10/04/24 Start of Care Date: 02/18/25     Medical Diagnosis:  AMD (age related macular degeneration) (H35.30)  - Primary      OT Treatment Diagnosis:  decreased ADL/IADL independence Plan of Treatment  Frequency/Duration:2x/week, decreasing frequency as indicated/6 months (extended due to potential scheduling conflicts)    Certification date from 02/18/25   To 05/18/25        See note for plan of treatment details and functional goals     Ирина Lim OT                         I CERTIFY THE NEED FOR THESE SERVICES FURNISHED UNDER        THIS PLAN OF TREATMENT AND WHILE UNDER MY CARE  .             Physician Signature               Date    X_____________________________________________________                  Referring Provider:  Ry Carrero    Initial Assessment  See Epic Evaluation- 02/18/25

## 2025-02-25 ENCOUNTER — THERAPY VISIT (OUTPATIENT)
Dept: OCCUPATIONAL THERAPY | Facility: CLINIC | Age: OVER 89
End: 2025-02-25
Attending: OPHTHALMOLOGY
Payer: MEDICARE

## 2025-02-25 DIAGNOSIS — H35.30 AMD (AGE RELATED MACULAR DEGENERATION): Primary | ICD-10-CM

## 2025-02-25 PROCEDURE — 97535 SELF CARE MNGMENT TRAINING: CPT | Mod: GO | Performed by: OCCUPATIONAL THERAPIST

## 2025-03-11 ENCOUNTER — THERAPY VISIT (OUTPATIENT)
Dept: OCCUPATIONAL THERAPY | Facility: CLINIC | Age: OVER 89
End: 2025-03-11
Attending: OPHTHALMOLOGY
Payer: MEDICARE

## 2025-03-11 DIAGNOSIS — H35.30 AMD (AGE RELATED MACULAR DEGENERATION): Primary | ICD-10-CM

## 2025-03-11 PROCEDURE — 97535 SELF CARE MNGMENT TRAINING: CPT | Mod: GO | Performed by: OCCUPATIONAL THERAPIST

## 2025-03-25 ENCOUNTER — THERAPY VISIT (OUTPATIENT)
Dept: OCCUPATIONAL THERAPY | Facility: CLINIC | Age: OVER 89
End: 2025-03-25
Attending: OPHTHALMOLOGY
Payer: MEDICARE

## 2025-03-25 DIAGNOSIS — H35.30 AMD (AGE RELATED MACULAR DEGENERATION): Primary | ICD-10-CM

## 2025-03-25 PROCEDURE — 97535 SELF CARE MNGMENT TRAINING: CPT | Mod: GO | Performed by: OCCUPATIONAL THERAPIST
